# Patient Record
Sex: FEMALE | Race: WHITE | NOT HISPANIC OR LATINO | Employment: FULL TIME | URBAN - NONMETROPOLITAN AREA
[De-identification: names, ages, dates, MRNs, and addresses within clinical notes are randomized per-mention and may not be internally consistent; named-entity substitution may affect disease eponyms.]

---

## 2017-05-05 ENCOUNTER — HISTORY (OUTPATIENT)
Dept: FAMILY MEDICINE | Facility: OTHER | Age: 37
End: 2017-05-05

## 2017-05-05 ENCOUNTER — OFFICE VISIT - GICH (OUTPATIENT)
Dept: FAMILY MEDICINE | Facility: OTHER | Age: 37
End: 2017-05-05

## 2017-05-05 DIAGNOSIS — Z00.00 ENCOUNTER FOR GENERAL ADULT MEDICAL EXAMINATION WITHOUT ABNORMAL FINDINGS: ICD-10-CM

## 2017-05-05 DIAGNOSIS — H60.542 ACUTE ECZEMATOID OTITIS EXTERNA OF LEFT EAR: ICD-10-CM

## 2017-05-05 DIAGNOSIS — Z12.4 ENCOUNTER FOR SCREENING FOR MALIGNANT NEOPLASM OF CERVIX: ICD-10-CM

## 2017-05-05 DIAGNOSIS — L65.9 NONSCARRING HAIR LOSS: ICD-10-CM

## 2017-05-05 DIAGNOSIS — M25.50 PAIN IN JOINT: ICD-10-CM

## 2017-05-05 DIAGNOSIS — R53.83 OTHER FATIGUE: ICD-10-CM

## 2017-05-05 LAB
ABSOLUTE BASOPHILS - HISTORICAL: 0.1 THOU/CU MM
ABSOLUTE EOSINOPHILS - HISTORICAL: 0.1 THOU/CU MM
ABSOLUTE LYMPHOCYTES - HISTORICAL: 1.2 THOU/CU MM (ref 0.9–2.9)
ABSOLUTE MONOCYTES - HISTORICAL: 0.4 THOU/CU MM
ABSOLUTE NEUTROPHILS - HISTORICAL: 3.4 THOU/CU MM (ref 1.7–7)
BASOPHILS # BLD AUTO: 1.4 %
EOSINOPHIL NFR BLD AUTO: 1 %
ERYTHROCYTE [DISTWIDTH] IN BLOOD BY AUTOMATED COUNT: 11.6 % (ref 11.5–15.5)
ERYTHROCYTE [SEDIMENTATION RATE] IN BLOOD: 7 MM/HR
FERRITIN SERPL-MCNC: 6.9 NG/ML (ref 23.9–336.2)
HCT VFR BLD AUTO: 40.6 % (ref 33–51)
HEMOGLOBIN: 13.6 G/DL (ref 12–16)
LYMPHOCYTES NFR BLD AUTO: 23 % (ref 20–44)
MCH RBC QN AUTO: 32.8 PG (ref 26–34)
MCHC RBC AUTO-ENTMCNC: 33.5 G/DL (ref 32–36)
MCV RBC AUTO: 98 FL (ref 80–100)
MONOCYTES NFR BLD AUTO: 7.7 %
NEUTROPHILS NFR BLD AUTO: 66.8 % (ref 42–72)
PLATELET # BLD AUTO: 251 THOU/CU MM (ref 140–440)
PMV BLD: 8.1 FL (ref 6.5–11)
RED BLOOD COUNT - HISTORICAL: 4.15 MIL/CU MM (ref 4–5.2)
T4 FREE SERPL-MCNC: 0.79 NG/DL (ref 0.58–1.64)
TSH - HISTORICAL: 2.22 UIU/ML (ref 0.34–5.6)
VITAMIN D TOTAL - HISTORICAL: 25.6 NG/ML
WHITE BLOOD COUNT - HISTORICAL: 5 THOU/CU MM (ref 4.5–11)

## 2017-05-06 LAB — LYME SCREEN W/REFLEX WEST BLOT - HISTORICAL: NEGATIVE

## 2017-05-24 ENCOUNTER — COMMUNICATION - GICH (OUTPATIENT)
Dept: FAMILY MEDICINE | Facility: OTHER | Age: 37
End: 2017-05-24

## 2017-05-24 DIAGNOSIS — R79.0 ABNORMAL LEVEL OF BLOOD MINERAL: ICD-10-CM

## 2017-05-24 DIAGNOSIS — E55.9 VITAMIN D DEFICIENCY: ICD-10-CM

## 2017-06-29 ENCOUNTER — COMMUNICATION - GICH (OUTPATIENT)
Dept: FAMILY MEDICINE | Facility: OTHER | Age: 37
End: 2017-06-29

## 2017-06-29 DIAGNOSIS — E55.9 VITAMIN D DEFICIENCY: ICD-10-CM

## 2017-08-12 ENCOUNTER — COMMUNICATION - GICH (OUTPATIENT)
Dept: FAMILY MEDICINE | Facility: OTHER | Age: 37
End: 2017-08-12

## 2017-08-12 DIAGNOSIS — E55.9 VITAMIN D DEFICIENCY: ICD-10-CM

## 2017-08-27 ENCOUNTER — COMMUNICATION - GICH (OUTPATIENT)
Dept: FAMILY MEDICINE | Facility: OTHER | Age: 37
End: 2017-08-27

## 2017-08-27 DIAGNOSIS — E55.9 VITAMIN D DEFICIENCY: ICD-10-CM

## 2017-10-01 ENCOUNTER — COMMUNICATION - GICH (OUTPATIENT)
Dept: FAMILY MEDICINE | Facility: OTHER | Age: 37
End: 2017-10-01

## 2017-10-01 DIAGNOSIS — R79.0 ABNORMAL LEVEL OF BLOOD MINERAL: ICD-10-CM

## 2017-11-04 ENCOUNTER — HISTORY (OUTPATIENT)
Dept: FAMILY MEDICINE | Facility: OTHER | Age: 37
End: 2017-11-04

## 2017-11-04 ENCOUNTER — OFFICE VISIT - GICH (OUTPATIENT)
Dept: FAMILY MEDICINE | Facility: OTHER | Age: 37
End: 2017-11-04

## 2017-11-04 DIAGNOSIS — J01.00 ACUTE MAXILLARY SINUSITIS: ICD-10-CM

## 2017-11-06 ENCOUNTER — COMMUNICATION - GICH (OUTPATIENT)
Dept: FAMILY MEDICINE | Facility: OTHER | Age: 37
End: 2017-11-06

## 2017-12-27 NOTE — PROGRESS NOTES
Patient Information     Patient Name Coni Nye [id number] Female 1980      Progress Notes by Grace Rojas NP at 2017 11:15 AM     Author:  Grace Rojas NP Service:  (none) Author Type:  PHYS- Nurse Practitioner     Filed:  2017 12:54 PM Encounter Date:  2017 Status:  Signed     :  Grace Rojas NP (PHYS- Nurse Practitioner)            HPI:    Coni Root is a 37 y.o. female who presents to clinic today for UR sx. She has had sore throat earlier in the week. Mild sneezing, sinus congestion. Having LGT. Having facial pain. Having a large amount of sinus drainage. Taking Advil cold and sinus and nose sprays for sx, helped initially but not fully resolving sx. She does have sx of sinusitis, not recurrent.      No past medical history on file.  No past surgical history on file.  Social History       Substance Use Topics         Smoking status:   Never Smoker     Smokeless tobacco:   Never Used     Alcohol use   Yes      Comment: wine few a week      Current Outpatient Prescriptions       Medication  Sig Dispense Refill     ergocalciferol (VITAMIN D2; DRISDOL) 50,000 unit capsule Take 1 capsule by mouth once weekly. 6 capsule 0     ferrous sulfate, 65 mg elemental, tablet Take 1 tablet by mouth 2 times daily with meals. 180 tablet 0     multivitamins-calcium-iron-minerals 18-0.4 mg tab tablet Take 1 tablet by mouth once daily.  0     triamcinolone (ARISTOCORT; KENALOG) 0.1 % cream Apply  topically to affected area(s) 3 times daily. 30 g 0     No current facility-administered medications for this visit.      Medications have been reviewed by me and are current to the best of my knowledge and ability.    No Known Allergies    ROS:  Pertinent positives and negatives are noted in HPI.    EXAM:  General appearance: ill but nontoxic appearing female, in no acute distress  Head: normocephalic, atraumatic, maxillary sinus tenderness  Ears: TM's with cone of light, no  erythema, canals clear bilaterally  Eyes: conjunctivae normal  Orophayrnx: moist mucous membranes, tonsils without erythema, exudates or petechiae, post nasal drip seen  Nose: bilateral turbinates swollen and erythematous  Neck: supple without adenopathy  Respiratory: clear to auscultation bilaterally  Cardiac: RRR with no murmurs  Psychological: normal affect, alert and pleasant    ASSESSMENT/PLAN:    ICD-10-CM    1. Acute maxillary sinusitis, recurrence not specified J01.00 amoxicillin-clavulanate 875-125 mg tablet (AUGMENTIN)   Tx with Augmentin for acute sinusitis. Reviewed need to complete all antibiotics. Discussed typical course of illness, symptomatic treatment and when to return to clinic. Patient in agreement with plan and all questions were answered.     Patient Instructions   Augmentin twice daily for 10 days  Use Flonase nasal spray  Take a probiotic or eat yogurt daily  Follow up as needed

## 2017-12-28 NOTE — TELEPHONE ENCOUNTER
Patient Information     Patient Name MRConi Maloney [id number] Female 1980      Telephone Encounter by Maribell Greenwood at 2017 10:42 AM     Author:  Maribell Greenwood Service:  (none) Author Type:  (none)     Filed:  2017 10:44 AM Encounter Date:  2017 Status:  Signed     :  Maribell Greenwood            Patient called stating she has a question regarding amoxicillin rx given . Please advise.    Maribell Greenwood ....................  2017   10:43 AM

## 2017-12-28 NOTE — TELEPHONE ENCOUNTER
Patient Information     Patient Name MRConi Maloney [id number] Female 1980      Telephone Encounter by Mj Escobar RN at 2017  4:38 PM     Author:  Mj Escobar RN Service:  (none) Author Type:  NURS- Registered Nurse     Filed:  2017  4:45 PM Encounter Date:  2017 Status:  Signed     :  Mj Escobar RN (NURS- Registered Nurse)            Writer received rx request for patient's ergocalciferol from [facility] pharmacy. Writer notes that this rx request had been responded to previously by this writer on 8/15/17, see 17 refill encounter. Rx refill is not appropriate. Patient needs to be seen by PCP, as well as needs recheck labs. Writer had previously notified patient of this via voicemail. Call placed to patient to discuss today. Was unable to reach patient at this time. Writer did leave additional detailed message on patient's voicemail at this time of above information and Dr. Herrera's response on 8/15/17. Writer will again refuse this rx request as per Dr. Herrera's response.    Unable to complete prescription refill per RN Medication Refill Policy.................... Mj Escobar RN ....................  2017   4:43 PM

## 2017-12-28 NOTE — TELEPHONE ENCOUNTER
Patient Information     Patient Name Coni Nye [id number] Female 1980      Telephone Encounter by Mj Escobar RN at 8/15/2017  4:35 PM     Author:  jM Escobar RN Service:  (none) Author Type:  NURS- Registered Nurse     Filed:  8/15/2017  4:37 PM Encounter Date:  2017 Status:  Signed     :  Mj Escobar RN (NURS- Registered Nurse)            Perfect. I left a message on her voicemail that she needed to be seen/have a lab appointment completed for a recheck vitamin D level previously today. I will refuse rx request at this time.    Unable to complete prescription refill per RN Medication Refill Policy.................... Mj Escobar RN ....................  8/15/2017   4:36 PM

## 2017-12-28 NOTE — TELEPHONE ENCOUNTER
Patient Information     Patient Name MRConi Maloney [id number] Female 1980      Telephone Encounter by Mj Escobar RN at 2017  9:28 AM     Author:  Mj Escobar RN Service:  (none) Author Type:  NURS- Registered Nurse     Filed:  2017  9:29 AM Encounter Date:  2017 Status:  Signed     :  Mj Escobar RN (NURS- Registered Nurse)            See refill request dated 17. Writer will close this encounter.    Mj Escobar RN ....................  2017   9:28 AM

## 2017-12-28 NOTE — TELEPHONE ENCOUNTER
Patient Information     Patient Name MRConi Maloney [id number] Female 1980      Telephone Encounter by Wendy Stacy at 2017 11:00 AM     Author:  Wendy Stacy Service:  (none) Author Type:  NURS- Student Practical Nurse     Filed:  2017 11:03 AM Encounter Date:  2017 Status:  Signed     :  Wendy Satcy (NURS- Student Practical Nurse)            Patient states that she is now having diarrhea, watery stools, about 3 times in 2 hours this morning starting a little yesterday. Patient is not currently having fevers but had one on Saturday and . Patient is also feeling weak. Patient states sinus congestion is getting better. Patient uses [facility]. Patient would like to know if this is normal.  Wendy Stacy LPN .............2017  11:02 AM

## 2017-12-28 NOTE — TELEPHONE ENCOUNTER
Patient Information     Patient Name MRN Coni Pineda [id number] Female 1980      Telephone Encounter by Marylou Yap at 2017  9:26 AM     Author:  Marylou Yap Service:  (none) Author Type:  (none)     Filed:  2017  9:27 AM Encounter Date:  2017 Status:  Signed     :  Marylou Yap RN refill Medication.  Marylou Yap LPN .............2017  9:26 AM

## 2017-12-28 NOTE — TELEPHONE ENCOUNTER
Patient Information     Patient Name MRN Coni Pineda [id number] Female 1980      Telephone Encounter by Kaushik Herrera MD at 8/15/2017  4:29 PM     Author:  Kaushik Herrera MD Service:  (none) Author Type:  Physician     Filed:  8/15/2017  4:29 PM Encounter Date:  2017 Status:  Signed     :  Kaushik Herrera MD (Physician)            Filling is not indicated. She needs to return for repeat vitamin D level.

## 2017-12-28 NOTE — TELEPHONE ENCOUNTER
Patient Information     Patient Name Coni Nye [id number] Female 1980      Telephone Encounter by Devon Rojas at 2017 11:50 AM     Author:  Devon Rojas Service:  (none) Author Type:  (none)     Filed:  2017 11:52 AM Encounter Date:  2017 Status:  Signed     :  Devon Rojas            After birth date was verified, spoke with patient and let her know the below information from Grace Rojas NP. Patient states that she will stay on her current antibiotic, as she is happy it is a normal side effect. No further questions or concerns.    Devon Rojas ....................  2017   11:51 AM

## 2017-12-28 NOTE — TELEPHONE ENCOUNTER
Patient Information     Patient Name MRN Coni Pindea [id number] Female 1980      Telephone Encounter by Mj Escobar RN at 8/15/2017  4:14 PM     Author:  Mj Escobar RN Service:  (none) Author Type:  NURS- Registered Nurse     Filed:  8/15/2017  4:22 PM Encounter Date:  2017 Status:  Signed     :  Mj Escobar RN (NURS- Registered Nurse)            This is a Refill request from: [facility]  Name of Medication: Vitamin D 50,000 units  Quantity requested: 6 capsules  Last fill date: 17 as per rx request and chart review  Due for refill: unknown, see below  Last visit with HUYEN TOM was on: 2017 in [facility]  PCP:  Huyen Tom MD  Controlled Substance Agreement: N/A   Diagnosis r/t this medication request: Vitamin D deficiency    Chart review shows that patient was to have her vitamin D level rechecked in 6 weeks after starting Vitamin D on 17. Level hasn't been rechecked as per chart review, and when rx was renewed for another 6 weeks on 17 refill encounter, patient was given a verbal reminder. No appointment noted... Call placed to patient today to discuss. Was unable to reach patient at this time. Detailed message left on patient's voicemail about need for a vitamin D level and lab only appointment. Writer is unable to fill rx as requested. Will route to PCP's teamlet in the absence of PCP. PCP returns on 17.     Unable to complete prescription refill per RN Medication Refill Policy.................... Mj Escobar RN ....................  8/15/2017   4:14 PM

## 2017-12-28 NOTE — TELEPHONE ENCOUNTER
Patient Information     Patient Name MRConi Maloney [id number] Female 1980      Telephone Encounter by Mj Escobar RN at 10/3/2017  1:26 PM     Author:  Mj Escobar RN Service:  (none) Author Type:  NURS- Registered Nurse     Filed:  10/3/2017  1:32 PM Encounter Date:  10/1/2017 Status:  Signed     :  Mj Escobar RN (NURS- Registered Nurse)            Iron    Office visit in the past 12 months or per provider note.    Last visit with FARIBA CARVER was on: 2017 in [facility]  Next visit with FARIBA CARVER is on: No future appointment listed with this provider  Next visit with Family Practice is on: No future appointment listed in this department    Lab test requirements:  Annual hemoglobin.  HEMOGLOBIN                (g/dL)    Date Value   2017 13.6     Max refill for 12 months from last office visit or per provider note.    Chart review shows that patient was last seen by PCP on 17. Telephone encounter dated 17 with plan in relation to rx as requested as follows:    Please apologize for the delay, it appears one of the nurses reviewed the labs so they dropped off my desktop  Iron stores are very low, could be causing some of her symptoms  Vit d is also on low end of normal  Other labs (thyroid) are normal  i have sent prescription for both iron and vit d, plan to recheck labs in 6 weeks    No follow up labs have been completed at this time. Writer will refill rx as requested for a limited supply, and will send patient a reminder letter.    Prescription refilled per RN Medication Refill Policy.................... Mj Escobar RN ....................  10/3/2017   1:29 PM

## 2017-12-28 NOTE — TELEPHONE ENCOUNTER
Patient Information     Patient Name Coni Nye [id number] Female 1980      Telephone Encounter by Grace Mcgee NP at 2017 11:14 AM     Author:  Grace Mcgee NP Service:  (none) Author Type:  PHYS- Nurse Practitioner     Filed:  2017 11:16 AM Encounter Date:  2017 Status:  Signed     :  Grace Mcgee NP (PHYS- Nurse Practitioner)            A side effect of Augmentin can be diarrhea. If she is having a lot of trouble with this we can change the abx. GRACE MCGEE NP ....................  2017   11:16 AM

## 2017-12-29 NOTE — PATIENT INSTRUCTIONS
Patient Information     Patient Name Coni Nye [id number] Female 1980      Patient Instructions by Grace Rojas NP at 2017 11:15 AM     Author:  Grace Rojas NP Service:  (none) Author Type:  PHYS- Nurse Practitioner     Filed:  2017 11:39 AM Encounter Date:  2017 Status:  Signed     :  Grace Rojas NP (PHYS- Nurse Practitioner)            Augmentin twice daily for 10 days  Use Flonase nasal spray  Take a probiotic or eat yogurt daily  Follow up as needed

## 2017-12-30 NOTE — NURSING NOTE
"Patient Information     Patient Name MRConi Maloney [id number] Female 1980      Nursing Note by Laurie Edmonds at 2017 11:15 AM     Author:  Laurie Edmonds Service:  (none) Author Type:  (none)     Filed:  2017 11:20 AM Encounter Date:  2017 Status:  Signed     :  Laurie Edmonds            Patient presents to the clinic for sinus drainage, sore throat, sinus pressure and ear pressure x1 week. Patient reports feeling her ears \"popping\" and having green colored sinus drainage.  Laurie CHA, DEVANTE.......2017..11:16 AM           "

## 2018-01-04 NOTE — NURSING NOTE
Patient Information     Patient Name MRN Coni Pineda [id number] Female 1980      Nursing Note by Marylou Yap at 2017 10:00 AM     Author:  Marylou Yap Service:  (none) Author Type:  (none)     Filed:  2017 10:15 AM Encounter Date:  2017 Status:  Signed     :  Marylou Yap            Patient is here for physical, complaining of being tired for past year. Complaining of neck, feet, and hand pain. Moved here last year from [place].  Marylou Yap LPN .............2017  9:59 AM

## 2018-01-05 NOTE — TELEPHONE ENCOUNTER
Patient Information     Patient Name MRConi Maloney [id number] Female 1980      Telephone Encounter by Huyen Tom MD at 2017 10:05 AM     Author:  Huyen Tom MD Service:  (none) Author Type:  Physician     Filed:  2017 10:08 AM Encounter Date:  2017 Status:  Signed     :  Huyen Tom MD (Physician)            Please apologize for the delay, it appears one of the nurses reviewed the labs so they dropped off my desktop  Iron stores are very low, could be causing some of her symptoms  Vit d is also on low end of normal  Other labs (thyroid) are normal  i have sent prescription for both iron and vit d, plan to recheck labs in 6 weeks  Huyen Astudillo MD  10:07 AM 2017

## 2018-01-05 NOTE — TELEPHONE ENCOUNTER
Patient Information     Patient Name MRConi Maloney [id number] Female 1980      Telephone Encounter by Ivelisse Packer at 2017  8:59 AM     Author:  Ivelisse Packer Service:  (none) Author Type:  (none)     Filed:  2017  9:01 AM Encounter Date:  2017 Status:  Signed     :  Ivelisse Packer            Patient is waiting for her lab results from her last visit. Please call her with results.

## 2018-01-05 NOTE — PROGRESS NOTES
Patient Information     Patient Name MRN Sex Coni Dominique [id number] Female 1980      Progress Notes by Huyen Tom MD at 2017 10:00 AM     Author:  Huyen Tom MD Service:  (none) Author Type:  Physician     Filed:  5/10/2017  9:51 AM Encounter Date:  2017 Status:  Signed     :  uHyen Tom MD (Physician)            ANNUAL EXAM ADULT FEMALE - GYN    Coni Root is a 36 y.o. female, G 2, P 2, here today for her annual gynecologic exam.  HPI:  Presents for annual exam. She is concerned about thinning hair, fatigue, inability to lose weight despite exercise and diet changes.  She had some depression when they first moved here, but now feels her mood is good. She sleeps well at night  Menses can be very heavy, flow 7 days then the next month lighter. NO abnormal pap smears  She has patch of dry skin by her ear.    CURRENT MEDICATIONS:  Current Outpatient Prescriptions       Medication  Sig Dispense Refill     multivitamins-calcium-iron-minerals 18-0.4 mg tab tablet Take 1 tablet by mouth once daily.  0     No current facility-administered medications for this visit.      Medications have been reviewed by me and are current to the best of my knowledge and ability.      ALLERGIES:  Review of patient's allergies indicates no known allergies.     No past medical history on file.    No past surgical history on file.    SOCIAL HISTORY:  Social History     Social History        Marital status:       Spouse name: N/A     Number of children:  N/A     Years of education:  N/A     Occupational History      Not on file.     Social History Main Topics          Smoking status:   Never Smoker      Smokeless tobacco:   Never Used      Alcohol use   Yes      Comment: wine few a week       Drug use:   No      Sexual activity:   Yes      Partners:  Male      Birth control/ protection:  Surgical      Other Topics  Concern     Not on file      Social History  "Narrative      No narrative on file       No family history on file.    RISK FACTORS  Social History       Substance Use Topics         Smoking status:   Never Smoker     Smokeless tobacco:   Never Used     Alcohol use   Yes      Comment: wine few a week       Exercise Times/wk: 4  Seat Belt Use: 100%  Birth Control Method: none  High Risk/Behavior: none    PREVENTIVE SERVICES  Preventive services were reviewed today, May 5, 2017.    LMP: Patient's last menstrual period was 04/28/2017.  Menstrual Regularity: regular, every 25 days  Flow: heavy    ROS  Negative for Review of Systems not covered in the HPI.    PHYSICAL EXAM  /66  Pulse 66  Ht 1.791 m (5' 10.5\")  Wt 77.2 kg (170 lb 3.2 oz)  LMP 04/28/2017  BMI 24.08 kg/m2  General Appearance:  Alert, appropriate appearance for age. No acute distress  HEENT Exam:  Grossly normal.  Neck / Thyroid Exam:  Supple, no masses, nodes or enlargement.  Chest/Respiratory Exam: Normal chest wall and respirations. Clear to auscultation.  Breast Exam: No dimpling, nipple retraction or discharge. No masses or nodes.  Cardiovascular Exam: Regular rate and rhythm. S1, S2, no murmur, click, gallop, or rubs.  Gastrointestinal Exam: Soft, non-tender, no masses or organomegaly.  Pelvic Exam Female: Vulva and vagina appear normal. Bimanual exam reveals normal uterus and adnexa. Clinical staff offered to be present for exam: .   Lymphatic Exam: Non-palpable nodes in neck, clavicular, axillary, or inguinal regions.  Musculoskeletal Exam: Back is straight and non-tender, full ROM of upper and lower extremities.  Skin: no rash or abnormalities  Neurologic Exam: Normal gait and speech, no tremor.  Psychiatric Exam: Alert and oriented, appropriate affect.  Clinical staff offered to be present for exam: yes, pt declined.  PHQ Depression Screening 5/5/2017   Date of PHQ exam (doc flow) 5/5/2017   1. Lack of interest/pleasure 0 - Not at all   2. Feeling down/depressed 0 - Not at all "   PHQ-2 TOTAL SCORE 0       ASSESSMENT/PLAN    ICD-10-CM    1. Annual physical exam Z00.00    2. Thinning hair L65.9 VITAMIN D 25 (DEFICIENCY)      TSH      T4,FREE      FERRITIN      VITAMIN D 25 (DEFICIENCY)      TSH      T4,FREE      FERRITIN   3. Fatigue, unspecified type R53.83 TSH      T4,FREE      LYME SCREEN W/REFLEX      CBC WITH DIFFERENTIAL      FERRITIN      TSH      T4,FREE      LYME SCREEN W/REFLEX      CBC WITH DIFFERENTIAL      FERRITIN      CBC WITH AUTO DIFFERENTIAL   4. Arthralgia, unspecified joint M25.50 SEDIMENTATION RATE      SEDIMENTATION RATE   5. Cervical cancer screening Z12.4 GYN THIN PREP PAP SCREEN IMAGED      GYN THIN PREP PAP SCREEN IMAGED   6. 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 Rios Body mass index is 24.08 kg/(m^2). This is within the normal range for a 36 y.o. Normal range for ages 18+ is between 18.5 and 24.9.   BP Readings from Last 1 Encounters:05/05/17 : 104/66  Ms. Rios blood pressure is within the normal range for adults. Per JNC-8 guidelines normal adult blood pressure is < 120/80, pre-hypertensive is between 120/80 and 139/89, and hypertension is 140/90 or greater.  Patient is counseled on importance of regular self breast exams, annual mammogram starting at the age of 40. Patient may go to every 3 years for screening Pap smears, should continue annual pelvic exams. Discussed the importance of calcium and vitamin D supplementation and screening for osteoporosis. Immunizations are current. Pap smear was performed today and patient will receive a letter with results. Reviewed the importance of sunscreen, seatbelt and helmet use.    Concerned about iron ef anemia, given menorrhagia. Will check above labs and contact with results.  triam cream for rash  Huyen Astudillo MD  9:51 AM 5/10/2017     There are no Patient Instructions on file for this visit.

## 2018-01-05 NOTE — TELEPHONE ENCOUNTER
Patient Information     Patient Name MRConi Maloney [id number] Female 1980      Telephone Encounter by Alton Wilkinson at 2017  5:10 PM     Author:  Alton Wilkinson Service:  (none) Author Type:  (none)     Filed:  2017  5:14 PM Encounter Date:  2017 Status:  Signed     :  Alton Wilkinson            Patient notified of Dr. Vega note.  Patient verbalizes understanding and will  meds and have labs rechecked in 6 weeks.  Alton Wilkinson LPN ....................  2017   5:11 PM

## 2018-01-26 ENCOUNTER — DOCUMENTATION ONLY (OUTPATIENT)
Dept: FAMILY MEDICINE | Facility: OTHER | Age: 38
End: 2018-01-26

## 2018-01-26 PROBLEM — H60.542 ECZEMA OF LEFT EXTERNAL EAR: Status: ACTIVE | Noted: 2017-05-05

## 2018-01-26 RX ORDER — ERGOCALCIFEROL 1.25 MG/1
50000 CAPSULE, LIQUID FILLED ORAL WEEKLY
COMMUNITY
Start: 2017-06-29 | End: 2019-04-22

## 2018-01-26 RX ORDER — FERROUS SULFATE 325(65) MG
325 TABLET ORAL 2 TIMES DAILY PRN
COMMUNITY
Start: 2017-10-03 | End: 2023-08-04

## 2018-01-26 RX ORDER — TRIAMCINOLONE ACETONIDE 1 MG/G
CREAM TOPICAL 3 TIMES DAILY
COMMUNITY
Start: 2017-05-05 | End: 2020-10-19

## 2018-01-27 VITALS
TEMPERATURE: 100.1 F | DIASTOLIC BLOOD PRESSURE: 70 MMHG | HEIGHT: 71 IN | BODY MASS INDEX: 24.36 KG/M2 | WEIGHT: 174 LBS | SYSTOLIC BLOOD PRESSURE: 118 MMHG | HEART RATE: 60 BPM

## 2018-01-27 VITALS
WEIGHT: 170.2 LBS | BODY MASS INDEX: 23.83 KG/M2 | SYSTOLIC BLOOD PRESSURE: 104 MMHG | HEIGHT: 71 IN | HEART RATE: 66 BPM | DIASTOLIC BLOOD PRESSURE: 66 MMHG

## 2018-07-23 NOTE — PROGRESS NOTES
Patient Information     Patient Name  Coni Root MRN  [id number] Sex  Female   1980      Letter by Huyen Tom MD at      Author:  Huyen Tom MD Service:  (none) Author Type:  (none)    Filed:   Encounter Date:  10/1/2017 Status:  (Other)           Coni Root  [address] [place] [address]          October 3, 2017    Dear Ms. Root:    This is to remind you that you are overdue for your 6 week follow-up labwork in relation to starting on vitamin D and iron on 17. Your last visit was on 17. Additional refills of your medication require you to complete this visit.    Please call [phone] to schedule your lab only appointment.    Thank you for choosing [facility] for your health care needs.    Sincerely,      Refill RN  [facility]

## 2018-07-23 NOTE — PROGRESS NOTES
Patient Information     Patient Name  Coni Root MRN  [id number] Sex  Female   1980      Letter by Huyen Tom MD at      Author:  Huyen Tom MD Service:  (none) Author Type:  (none)    Filed:   Encounter Date:  2017 Status:  (Other)           Coni Root  [address] [place] [address]          May 16, 2017    Dear Ms. Root:    Recent pap smear was normal    Sincerely,  Huyen Astudillo MD  12:18 PM 2017

## 2018-11-24 ENCOUNTER — OFFICE VISIT (OUTPATIENT)
Dept: FAMILY MEDICINE | Facility: OTHER | Age: 38
End: 2018-11-24
Attending: NURSE PRACTITIONER
Payer: COMMERCIAL

## 2018-11-24 VITALS
SYSTOLIC BLOOD PRESSURE: 100 MMHG | WEIGHT: 175.4 LBS | TEMPERATURE: 97.8 F | HEIGHT: 70 IN | DIASTOLIC BLOOD PRESSURE: 52 MMHG | HEART RATE: 56 BPM | BODY MASS INDEX: 25.11 KG/M2

## 2018-11-24 DIAGNOSIS — J06.9 VIRAL UPPER RESPIRATORY TRACT INFECTION: ICD-10-CM

## 2018-11-24 DIAGNOSIS — R09.81 SINUS CONGESTION: Primary | ICD-10-CM

## 2018-11-24 PROCEDURE — 99213 OFFICE O/P EST LOW 20 MIN: CPT | Performed by: NURSE PRACTITIONER

## 2018-11-24 ASSESSMENT — PAIN SCALES - GENERAL: PAINLEVEL: SEVERE PAIN (6)

## 2018-11-24 NOTE — PROGRESS NOTES
"Nursing Notes:   Mary Jo Bowling LPN  11/24/2018 10:21 AM  Unsigned  Patient in clinic for L ear and sinus problem 11/21. Has been treating with advil cold and sinus/and netti pot. Not effective last 2 days. Tubes in left ear as an adult.  Mary Jo Bowling LPN....................  11/24/2018   10:19 AM    Chief Complaint   Patient presents with     Sinus Problem     Ear Problem       Initial There were no vitals taken for this visit. Estimated body mass index is 24.44 kg/(m^2) as calculated from the following:    Height as of 11/4/17: 5' 10.75\" (1.797 m).    Weight as of 11/4/17: 174 lb (78.9 kg).  Medication Reconciliation: complete    Mary Jo Bowling LPN      SUBJECTIVE:   Coni Root is a 38 year old female who presents to clinic today for the following health issues:    RESPIRATORY SYMPTOMS      Duration: 4-5 days    Description  nasal congestion, rhinorrhea, facial pain/pressure, cough and ear pain left    Severity: moderate    Accompanying signs and symptoms: No fevers, chills, no sob, wheezing.     History (predisposing factors):  history of recurrent otitis media and had tubes placed 2 years ago. Is not present now.     Precipitating or alleviating factors: None    Therapies tried and outcome:  rest and fluids oral decongestant        Problem list and histories reviewed & adjusted, as indicated.  Additional history: as documented    Current Outpatient Prescriptions   Medication Sig Dispense Refill     ferrous sulfate (IRON) 325 (65 FE) MG tablet Take 325 mg by mouth 2 times daily (with meals)       triamcinolone (KENALOG) 0.1 % cream Apply topically 3 times daily       vitamin D (ERGOCALCIFEROL) 43145 UNIT capsule Take 50,000 Units by mouth once a week       No Known Allergies      ROS:  Notable findings in the HPI.       OBJECTIVE:     /52 (BP Location: Right arm, Patient Position: Sitting, Cuff Size: Adult Regular)  Pulse 56  Temp 97.8  F (36.6  C) (Tympanic)  Ht 5' 10\" (1.778 m)  Wt 175 lb " 6.4 oz (79.6 kg)  LMP 11/03/2018 (Approximate)  Breastfeeding? No  BMI 25.17 kg/m2  Body mass index is 25.17 kg/(m^2).  GENERAL: healthy, alert and no distress  EYES: Eyes grossly normal to inspection  HENT: normal cephalic/atraumatic, right ear: normal: no effusions, no erythema, normal landmarks, left ear: normal: no effusions, no erythema, normal landmarks, nose and mouth without ulcers or lesions, nasal mucosa edematous , rhinorrhea clear, oropharynx clear, oral mucous membranes moist and sinuses: maxillary, frontal tenderness on bilateral  NECK: no adenopathy  RESP: lungs clear to auscultation - no rales, rhonchi or wheezes  CV: regular rates and rhythm, normal S1 S2, no S3 or S4 and no murmur, click or rub  SKIN: no suspicious lesions or rashes    Diagnostic Test Results:  none     ASSESSMENT/PLAN:     1. Sinus congestion    2. Viral upper respiratory tract infection        PLAN:    URI Adult:  Tylenol, Ibuprofen, Fluids, Rest, OTC cough suppressant/expectorant, OTC decongestant/antihistamine, Saline gargles, Saline nasal spray and Vaporizer  Discussed viral illnesses and JESUS, Symptoms likely due to virus. No antibiotic is needed at this time. Symptoms typically worse on days 2-5 and then stabilize and you are sick for days 5-12. Days 12-14 there is slow resolution and if there is a cough, studies show it can linger longer, however one is not as ill as in the beginning. If symptoms begin worsening or fail to improve after 14 days, return to clinic for reevaluation. All questions were answered and she is in agreement with plan.       Followup:    If not improving or if condition worsens, follow up with your Primary Care Provider    I explained my diagnostic considerations and recommendations to the patient, who voiced understanding and agreement with the treatment plan. All questions were answered. We discussed potential side effects of any prescribed or recommended therapies, as well as expectations for  response to treatments. She was advised to contact our office if there is no improvement or worsening of conditions or symptoms.  If s/s worsen or persist, patient will either come back or follow up with PCP.    Disclaimer:  This note consists of words and symbols derived from keyboarding, dictation, or using voice recognition software. As a result, there may be errors in the script that have gone undetected. Please consider this when interpreting information found in this note.      Nicky Pierre NP, 11/24/2018 10:27 AM

## 2018-11-24 NOTE — NURSING NOTE
"Patient in clinic for L ear and sinus problem 11/21. Has been treating with advil cold and sinus/and netti pot. Not effective last 2 days. Tubes in left ear as an adult.  Mary Jo Bowling LPN....................  11/24/2018   10:19 AM    Chief Complaint   Patient presents with     Sinus Problem     Ear Problem       Initial There were no vitals taken for this visit. Estimated body mass index is 24.44 kg/(m^2) as calculated from the following:    Height as of 11/4/17: 5' 10.75\" (1.797 m).    Weight as of 11/4/17: 174 lb (78.9 kg).  Medication Reconciliation: complete    Mary Jo Bowling LPN    "

## 2018-11-24 NOTE — PATIENT INSTRUCTIONS
Cold Medicines   What are cold medicines?  Symptoms of the common cold start gradually over several days and usually last about two weeks. Symptoms may include sneezing, a stuffy or runny nose, sore throat, cough, watery eyes, mild headache, or body aches. A cold will go away on its own without treatment. However, there are many nonprescription products that may help relieve some of the symptoms of a cold. Cold medicines often contain more than one ingredient and are used to treat more than one symptom. Read the labels and buy products that have only the ingredients that you need. If you are not sure which medicine is best, ask your pharmacist.  How do they work?  Decongestants reduce swelling in your nose and sinuses. They may also lessen the amount of mucus made by your nose. If you use decongestants more often than directed, your stuffy nose may get worse.   Antihistamines block the effect of histamine. Histamine is a chemical your body makes when you have an allergic reaction. Antihistamines are most often used to treat itchy or watery eyes or a stuffy or runny nose caused by an allergy. Antihistamines may not help a stuffy or runny nose caused by a cold because they can make mucus thick and dry.  Mucolytics are medicines that make mucus thinner so that it is easier to cough up out of your throat and lungs.  Expectorants are cough medicines that may help to keep the mucus thin and bring up mucus from the lungs when you cough. This may relieve chest congestion and make it easier to breathe.   Cough suppressants (antitussives) are medicines that lessen the urge to cough. They may give relief from a dry, hacking cough. If you have a cough that is wet sounding and produces mucus, it is important for you to cough the mucus up out of your lungs. For this reason, cough suppressants are not recommended for a wet sounding cough.  Fever and pain relievers, such as acetaminophen, aspirin, or other nonsteroidal  anti-inflammatory drugs (NSAIDs), are often included in cold medicine. Read labels carefully to avoid taking more medicine than you need.  What else do I need to know about this medicine?    Talk to your healthcare provider if your symptoms start suddenly or you have severe symptoms. This may mean you have something more serious than a cold.    Follow the directions that come with your medicine, including information about food or alcohol. Make sure you know how and when to take your medicine. Do not take more or less than you are supposed to take.    Try to get all of your medicine at the same place. Your pharmacist can help make sure that all of your medicines are safe to take together.    Keep a list of your medicines with you. List all of the prescription medicines, nonprescription medicines, supplements, natural remedies, and vitamins that you take. Tell all healthcare providers who treat you about all of the products you are taking.    Many medicines have side effects. A side effect is a symptom or problem that is caused by the medicine. Ask your healthcare provider or pharmacist what side effects the medicine may cause and what you should do if you have side effects.    Nonsteroidal anti-inflammatory medicines (NSAIDs), such as ibuprofen, naproxen, and aspirin, may cause stomach bleeding and other problems. These risks increase with age. Read the label and take as directed. Unless recommended by your healthcare provider, do not take for more than 10 days for any reason.    Acetaminophen may cause liver damage or other problems. Unless recommended by your provider, don't take more than 3000 milligrams (mg) in 24 hours. To make sure you don t take too much, check other medicines you take to see if they also contain acetaminophen. Ask your provider if you need to avoid drinking alcohol while taking this medicine.  If you have any questions, ask your healthcare provider or pharmacist for more information. Be sure  to keep all appointments for provider visits or tests.      Symptoms likely due to virus. No antibiotic is needed at this time. Symptoms typically worse on days 2-5 and then stabilize and you are sick for days 5-12. Days 12-14 there is slow resolution and if there is a cough, studies show it can linger longer, however one is not as ill as in the beginning. If symptoms begin worsening or fail to improve after 14 days, return to clinic for reevaluation.

## 2018-11-24 NOTE — MR AVS SNAPSHOT
After Visit Summary   11/24/2018    Coni Root    MRN: [id number]           Patient Information     Date Of Birth          1980        Visit Information        Provider Department      11/24/2018 10:15 AM Nicky Pierre NP [facility]        Care Instructions    Cold Medicines   What are cold medicines?  Symptoms of the common cold start gradually over several days and usually last about two weeks. Symptoms may include sneezing, a stuffy or runny nose, sore throat, cough, watery eyes, mild headache, or body aches. A cold will go away on its own without treatment. However, there are many nonprescription products that may help relieve some of the symptoms of a cold. Cold medicines often contain more than one ingredient and are used to treat more than one symptom. Read the labels and buy products that have only the ingredients that you need. If you are not sure which medicine is best, ask your pharmacist.  How do they work?  Decongestants reduce swelling in your nose and sinuses. They may also lessen the amount of mucus made by your nose. If you use decongestants more often than directed, your stuffy nose may get worse.   Antihistamines block the effect of histamine. Histamine is a chemical your body makes when you have an allergic reaction. Antihistamines are most often used to treat itchy or watery eyes or a stuffy or runny nose caused by an allergy. Antihistamines may not help a stuffy or runny nose caused by a cold because they can make mucus thick and dry.  Mucolytics are medicines that make mucus thinner so that it is easier to cough up out of your throat and lungs.  Expectorants are cough medicines that may help to keep the mucus thin and bring up mucus from the lungs when you cough. This may relieve chest congestion and make it easier to breathe.   Cough suppressants (antitussives) are medicines that lessen the urge to cough. They may give relief from a dry,  hacking cough. If you have a cough that is wet sounding and produces mucus, it is important for you to cough the mucus up out of your lungs. For this reason, cough suppressants are not recommended for a wet sounding cough.  Fever and pain relievers, such as acetaminophen, aspirin, or other nonsteroidal anti-inflammatory drugs (NSAIDs), are often included in cold medicine. Read labels carefully to avoid taking more medicine than you need.  What else do I need to know about this medicine?    Talk to your healthcare provider if your symptoms start suddenly or you have severe symptoms. This may mean you have something more serious than a cold.    Follow the directions that come with your medicine, including information about food or alcohol. Make sure you know how and when to take your medicine. Do not take more or less than you are supposed to take.    Try to get all of your medicine at the same place. Your pharmacist can help make sure that all of your medicines are safe to take together.    Keep a list of your medicines with you. List all of the prescription medicines, nonprescription medicines, supplements, natural remedies, and vitamins that you take. Tell all healthcare providers who treat you about all of the products you are taking.    Many medicines have side effects. A side effect is a symptom or problem that is caused by the medicine. Ask your healthcare provider or pharmacist what side effects the medicine may cause and what you should do if you have side effects.    Nonsteroidal anti-inflammatory medicines (NSAIDs), such as ibuprofen, naproxen, and aspirin, may cause stomach bleeding and other problems. These risks increase with age. Read the label and take as directed. Unless recommended by your healthcare provider, do not take for more than 10 days for any reason.    Acetaminophen may cause liver damage or other problems. Unless recommended by your provider, don't take more than 3000 milligrams (mg) in 24  "hours. To make sure you don t take too much, check other medicines you take to see if they also contain acetaminophen. Ask your provider if you need to avoid drinking alcohol while taking this medicine.  If you have any questions, ask your healthcare provider or pharmacist for more information. Be sure to keep all appointments for provider visits or tests.      Symptoms likely due to virus. No antibiotic is needed at this time. Symptoms typically worse on days 2-5 and then stabilize and you are sick for days 5-12. Days 12-14 there is slow resolution and if there is a cough, studies show it can linger longer, however one is not as ill as in the beginning. If symptoms begin worsening or fail to improve after 14 days, return to clinic for reevaluation.              Follow-ups after your visit        Who to contact     If you have questions or need follow up information about today's clinic visit or your schedule please contact [facility] directly at [phone].  Normal or non-critical lab and imaging results will be communicated to you by [employer], letter or phone within 4 business days after the clinic has received the results. If you do not hear from us within 7 days, please contact the clinic through [employer] or phone. If you have a critical or abnormal lab result, we will notify you by phone as soon as possible.  Submit refill requests through [employer] or call your pharmacy and they will forward the refill request to us. Please allow 3 business days for your refill to be completed.          Additional Information About Your Visit        [employer] Information     [employer] lets you send messages to your doctor, view your test results, renew your prescriptions, schedule appointments and more. To sign up, go to [email or web address] . Click on \"Log in\" on the left side of the screen, which will take you to the Welcome page. Then click on \"Sign up Now\" on the right side of the page.     You will be " "asked to enter the access code listed below, as well as some personal information. Please follow the directions to create your username and password.     Your access code is: [email or web address]  Expires: 2019 10:33 AM     Your access code will  in 90 days. If you need help or a new code, please call your [facility] clinic or [phone].        Care EveryWhere ID     This is your Care EveryWhere ID. This could be used by other organizations to access your [facility] medical records  [id number]        Your Vitals Were     Pulse Temperature Height Last Period Breastfeeding? BMI (Body Mass Index)    56 97.8  F (36.6  C) (Tympanic) 5' 10\" (1.778 m) 2018 (Approximate) No 25.17 kg/m2       Blood Pressure from Last 3 Encounters:   18 100/52   17 118/70   17 104/66    Weight from Last 3 Encounters:   18 175 lb 6.4 oz (79.6 kg)   17 174 lb (78.9 kg)   17 170 lb 3.2 oz (77.2 kg)              Today, you had the following     No orders found for display       Primary Care Provider Office Phone # Fax #    Huyen Astudillo [phone]       [address] [facility] [address]        Equal Access to Services     [facility]: Hadii aad salinas hadasho Sodoreen, waaxda luqadaha, qaybta kaalmada elfego, yenny allen . So [facility] [phone].    ATENCIÓN: Si habla español, tiene a kumar disposición servicios gratuitos de asistencia lingüística. Llame al [phone].    We comply with applicable federal civil rights laws and [place] laws. We do not discriminate on the basis of race, color, national origin, age, disability, sex, sexual orientation, or gender identity.            Thank you!     Thank you for choosing [facility] [place]  for your care. Our goal is always to provide you with excellent care. Hearing back from our patients is one way we can continue to improve our services. Please take a few minutes to " complete the written survey that you may receive in the mail after your visit with us. Thank you!             Your Updated Medication List - Protect others around you: Learn how to safely use, store and throw away your medicines at www.disposemymeds.org.          This list is accurate as of 11/24/18 10:33 AM.  Always use your most recent med list.                   Brand Name Dispense Instructions for use Diagnosis    ferrous sulfate 325 (65 Fe) MG tablet    IRON     Take 325 mg by mouth 2 times daily (with meals)        triamcinolone 0.1 % cream    KENALOG     Apply topically 3 times daily        vitamin D2 85951 UNIT capsule    ERGOCALCIFEROL     Take 50,000 Units by mouth once a week

## 2019-04-22 ENCOUNTER — OFFICE VISIT (OUTPATIENT)
Dept: FAMILY MEDICINE | Facility: OTHER | Age: 39
End: 2019-04-22
Attending: PHYSICIAN ASSISTANT
Payer: COMMERCIAL

## 2019-04-22 VITALS
BODY MASS INDEX: 24.65 KG/M2 | HEART RATE: 58 BPM | DIASTOLIC BLOOD PRESSURE: 80 MMHG | WEIGHT: 171.8 LBS | SYSTOLIC BLOOD PRESSURE: 102 MMHG | RESPIRATION RATE: 18 BRPM | TEMPERATURE: 97.3 F

## 2019-04-22 DIAGNOSIS — L98.9 SKIN LESION: Primary | ICD-10-CM

## 2019-04-22 PROCEDURE — 88305 TISSUE EXAM BY PATHOLOGIST: CPT

## 2019-04-22 PROCEDURE — 11301 SHAVE SKIN LESION 0.6-1.0 CM: CPT | Performed by: PHYSICIAN ASSISTANT

## 2019-04-22 NOTE — NURSING NOTE
Chief Complaint   Patient presents with     Mole         Medication Reconciliation: complete    Catarina Colin, LPN

## 2019-04-22 NOTE — PROGRESS NOTES
Nursing Notes:   Catarina Colin LPN  2019 12:47 PM  Signed  Chief Complaint   Patient presents with     Mole         Medication Reconciliation: complete    Catarina Colin LPN      HPI:    Coni Root is a 38 year old female who presents for mole. Changing mole on left lower breast.  Irritated.  Speckled brown.  No skin cancer concerns in the past.    History reviewed. No pertinent past medical history.    History reviewed. No pertinent surgical history.    Family History   Problem Relation Age of Onset     Colon Cancer Father 73        Cancer-colon, age 73     Family History Negative Brother         Good Health       Social History     Tobacco Use     Smoking status: Never Smoker     Smokeless tobacco: Never Used   Substance Use Topics     Alcohol use: Yes     Comment: Alcoholic Drinks/day: wine few a week       Current Outpatient Medications   Medication Sig Dispense Refill     ferrous sulfate (IRON) 325 (65 FE) MG tablet Take 325 mg by mouth 2 times daily (with meals)       triamcinolone (KENALOG) 0.1 % cream Apply topically 3 times daily         No Known Allergies    REVIEW OF SYSTEMS:  Refer to HPI.    EXAM:   Vitals:    /80 (BP Location: Right arm, Patient Position: Sitting, Cuff Size: Adult Regular)   Pulse 58   Temp 97.3  F (36.3  C)   Resp 18   Wt 77.9 kg (171 lb 12.8 oz)   BMI 24.65 kg/m      General Appearance: Pleasant, alert, appropriate appearance for age. No acute distress  Skin: Approximate 7 x 7 mm light to medium speckled brown, raised papule appreciated on left lower mid breast line.  No pus, drainage, surrounding erythema.  Psychiatric Exam: Alert and oriented - appropriate affect.    PHQ Depression Screen  No flowsheet data found.    Procedural note:   Options are discussed and she decided to proceed with skin lesion removal. Time out was called. Patient was prepped and draped in a proper sterile manner. Under sterile conditions, 1 cc 1% Lidocaine  with epi was used to numb the surrounding area. A #15 blade and forceps were used to shave the skin lesion.  Hemostasis was achieved with direct pressure and with the use of drysol. She tolerated the procedure well.  Sterile bandage was placed on the area.  No complications were appreciated.    ASSESSMENT AND PLAN:      ICD-10-CM    1. Skin lesion L98.9 Surgical pathology exam     SHAV SKIN LESION TRUNK/ARM/LEG 0.6-1.0 CM         Removed skin lesion via skin shave with no complications.  Patient tolerated the procedure well.  Sent specimen to pathology for final report.  Encouraged to keep area covered with a sterile bandage.  Return as needed for recheck.  Give infection warning signs and symptoms.    Debbie Bray PA-C..................4/22/2019 8:08 AM

## 2019-05-25 NOTE — TELEPHONE ENCOUNTER
Patient Information     Patient Name MRN Coni Pineda [id number] Female 1980      Telephone Encounter by Mj Escobar RN at 2017  9:30 AM     Author:  Mj Escobar RN Service:  (none) Author Type:  NURS- Registered Nurse     Filed:  2017  9:35 AM Encounter Date:  2017 Status:  Signed     :  Mj Escobar RN (NURS- Registered Nurse)            This is a Refill request from: [facility]  Name of Medication: Vitamin D 50,000 units  Quantity requested: 6 capsules  Last fill date: 17 as per rx request  Due for refill: Unknown, see below  Last visit with HUYEN TOM was on: 2017 in [facility]  PCP:  Huyen Tom MD  Controlled Substance Agreement:  N/A   Diagnosis r/t this medication request: Vitamin D Deficiency    Chart review shows that requested rx was prescribed by PCP on 17. Patient was to take rx for 6 weeks and then have labs rechecked as per 17 telephone encounter. However, per telephone encounter dated today, 17, patient will not have insurance until after . Patient is requesting a refill of rx as requested until her insurance starts in July. Writer is unable to refill requested rx due to rx not being available over the counter. Will route rx request to PCP for her consideration/approval.     Unable to complete prescription refill per RN Medication Refill Policy.................... Mj Escobar RN ....................  2017   9:30 AM         25-May-2019 15:18

## 2019-07-09 ENCOUNTER — HOSPITAL ENCOUNTER (OUTPATIENT)
Dept: GENERAL RADIOLOGY | Facility: OTHER | Age: 39
Discharge: HOME | End: 2019-07-09
Attending: FAMILY MEDICINE | Admitting: FAMILY MEDICINE
Payer: COMMERCIAL

## 2019-07-09 ENCOUNTER — OFFICE VISIT (OUTPATIENT)
Dept: FAMILY MEDICINE | Facility: OTHER | Age: 39
End: 2019-07-09
Attending: FAMILY MEDICINE
Payer: COMMERCIAL

## 2019-07-09 VITALS
TEMPERATURE: 99 F | HEIGHT: 70 IN | BODY MASS INDEX: 24.91 KG/M2 | RESPIRATION RATE: 18 BRPM | OXYGEN SATURATION: 99 % | SYSTOLIC BLOOD PRESSURE: 124 MMHG | HEART RATE: 54 BPM | WEIGHT: 174 LBS | DIASTOLIC BLOOD PRESSURE: 70 MMHG

## 2019-07-09 DIAGNOSIS — R79.0 LOW FERRITIN: ICD-10-CM

## 2019-07-09 DIAGNOSIS — Z83.42 FAMILY HISTORY OF HIGH CHOLESTEROL: ICD-10-CM

## 2019-07-09 DIAGNOSIS — Z00.00 ANNUAL PHYSICAL EXAM: Primary | ICD-10-CM

## 2019-07-09 DIAGNOSIS — M54.2 BILATERAL NECK PAIN: ICD-10-CM

## 2019-07-09 DIAGNOSIS — Z12.31 ENCOUNTER FOR SCREENING MAMMOGRAM FOR BREAST CANCER: ICD-10-CM

## 2019-07-09 PROCEDURE — 99396 PREV VISIT EST AGE 40-64: CPT | Performed by: FAMILY MEDICINE

## 2019-07-09 PROCEDURE — 72040 X-RAY EXAM NECK SPINE 2-3 VW: CPT

## 2019-07-09 RX ORDER — CYCLOBENZAPRINE HCL 10 MG
10 TABLET ORAL
Qty: 30 TABLET | Refills: 0 | Status: SHIPPED | OUTPATIENT
Start: 2019-07-09 | End: 2020-10-19

## 2019-07-09 RX ORDER — FAMOTIDINE 20 MG
TABLET ORAL
COMMUNITY
End: 2023-08-04

## 2019-07-09 ASSESSMENT — MIFFLIN-ST. JEOR: SCORE: 1543.48

## 2019-07-09 ASSESSMENT — PAIN SCALES - GENERAL: PAINLEVEL: MODERATE PAIN (5)

## 2019-07-09 NOTE — PROGRESS NOTES
Female Physical Note    Concerns today: Nursing Notes:   AmeniaMarylou., LPN  2019  3:49 PM  Signed  Patient is here for annual physical. Has a few questions.   Marylou Huitronjordana LEVINE .............2019     3:19 PM      Patient's last menstrual period was 2019 (exact date).  Medication Reconciliation: complete    Marylou AREVALO Amenia, LPN  2019 3:23 PM  39-year-old G2, P2 presents for annual preventive physical.  She has a few concerns.  Father  of colon cancer age 71  2 brothers, healthy.  She is wondering if this impacts her risk of colon cancer.  She denies blood in her stools or change in caliber.    Regular menses, flow is heavy with some clots the first 1 to 2 days.  Neck pain x 6m opnths, seeing chiropractor      ROS:  CONSTITUTIONAL: no fatigue, no unexpected change in weight  SKIN: no worrisome rashes, no worrisome moles, no worrisome lesions  EYES: no acute vision problems or changes  ENT: no ear problems, no mouth problems, no throat problems  RESP: no significant cough, no shortness of breath  CV: no chest pain, no palpitations, no new or worsening peripheral edema  GI: no nausea, no vomiting, no constipation, no diarrhea  MUSCULOSKELETAL:see hPi  ENDOCRINE: no temperature intolerance, no skin/hair changes  PSYCHIATRIC: NEGATIVE for changes in mood or affect    Sexually Active: Yes  Sexual concerns: No   Contraception:not needed   P: 2  Menarche:  Patient's last menstrual period was 2019 (exact date). Menses: q 30 days  STD History: Neg  Last Pap Smear Date:  normal  Abnormal Pap History: None    Patient Active Problem List   Diagnosis     Eczema of left external ear       Current Outpatient Medications   Medication Sig Dispense Refill     ferrous sulfate (IRON) 325 (65 FE) MG tablet Take 325 mg by mouth 2 times daily (with meals)       Multiple Vitamins-Minerals (MULTIVITAMIN ADULTS PO)        triamcinolone (KENALOG) 0.1 % cream Apply topically 3 times daily       Vitamin D,  "Cholecalciferol, 1000 units CAPS          History reviewed. No pertinent past medical history.     Family History     Problem (# of Occurrences) Relation (Name,Age of Onset)    Colon Cancer (1) Father (73): Cancer-colon, age 73    Family History Negative (1) Brother: Good Health          Problem List Medication List and Allergy List were reviewed.    Patient is an established patient of this clinic..    Social History     Tobacco Use     Smoking status: Never Smoker     Smokeless tobacco: Never Used   Substance Use Topics     Alcohol use: Yes     Comment: Alcoholic Drinks/day: wine few a week       Children ? yes     Has anyone hurt you physically, for example by pushing, hitting, slapping or kicking you or forcing you to have sex? Denies  Do you feel threatened or controlled by a partner, ex-partner or anyone in your life? Denies    RISK BEHAVIORS AND HEALTHY HABITS:  Tobacco Use/Smoking: None  Illicit Drug Use: None  Do you use alcohol? Yes  Number of drinking days a week 2  Diet (5-7 servings of fruits/veg daily): Yes   Exercise (30 min accumulated most days):Yes  Dental Care: Yes   Calcium 1500 mg/d:  Yes  Seat Belt Use: Yes     Pap/HPV cotest every 5 years for women 30-65   Testing not indicated   Colon CA Screening (>50-75 ):  Testing not indicated   CV Risk based on Pooled Cohort Risk:   Pooled Cohort Risk Calculator    Immunization History   Administered Date(s) Administered     Influenza (H1N1) 2010     Influenza (IIV3) PF 10/20/2010, 2011, 2012     Influenza Intranasal Vaccine 2009    Reviewed Immunization Record Today    EXAMINATION:   /70 (BP Location: Right arm, Patient Position: Sitting, Cuff Size: Adult Regular)   Pulse 54   Temp 99  F (37.2  C) (Tympanic)   Resp 18   Ht 1.784 m (5' 10.25\")   Wt 78.9 kg (174 lb)   LMP 2019 (Exact Date)   SpO2 99%   Breastfeeding? No   BMI 24.79 kg/m    GENERAL: healthy, alert and no distress  EYES: Eyes grossly " normal to inspection, extraocular movements - intact, and PERRL  HENT: ear canals- normal; TMs- normal; Nose- normal; Mouth- no ulcers, no lesions  NECK: no tenderness, no adenopathy, no asymmetry, no masses, no stiffness; thyroid- normal to palpation  RESP: lungs clear to auscultation - no rales, no rhonchi, no wheezes  BREAST: no masses, no tenderness, no nipple discharge, no palpable axillary masses or adenopathy  CV: regular rates and rhythm, normal S1 S2, no S3 or S4 and no murmur, no click or rub -  ABDOMEN: soft, no tenderness, no  hepatosplenomegaly, no masses, normal bowel sounds  MS: extremities- no gross deformities noted, no edema  SKIN: no suspicious lesions, no rashes  NEURO: strength and tone- normal, sensory exam- grossly normal, mentation- intact, speech- normal, reflexes- symmetric  BACK: no CVA tenderness, no paralumbar tenderness  PSYCH: Alert and oriented times 3; speech- coherent , normal rate and volume; able to articulate logical thoughts, able to abstract reason, no tangential thoughts, no hallucinations or delusions, affect- normal  LYMPHATICS: ant. cervical- normal, post. cervical- normal, axillary- normal, supraclavicular- normal, inguinal- normal    ASSESSMENT:  1. Annual physical exam    2. Low ferritin    3. Bilateral neck pain    4. Encounter for screening mammogram for breast cancer    5. Family history of high cholesterol          PLAN:  Patient is counseled on importance of regular self breast exams and mammograms every 1-2 years starting at age 40. Patient will proceed with pap smears every 3-5 years until age 65. Discussed importance of calcium and vitamin D supplementation and osteoporosis screening. Immunizations are updated based on CDC recommendations and patients desire. Reviewed importance of sunscreen, limit sun exposure and monitoring for changing moles with ABCDEs. Recommend seatbelt use and helmets with biking, skiing and ATV/Snowmobile use.    Patient Instructions    Check with insurance:  Do they cover Cologuard for colon cancer screening?  Do they cover 3D Mammogram for breast cancer screening?    Check neck xray  Start flexeril 10 mg at bedtime, muscle relaxant and continue stretches    Check cbc and ferritin, cholesterol panel and fasting blood sugar    Options for heavy menses include birth control pills, IUD or endometrial ablation      Huyen Tom MD

## 2019-07-09 NOTE — PATIENT INSTRUCTIONS
Check with insurance:  Do they cover Cologuard for colon cancer screening?  Do they cover 3D Mammogram for breast cancer screening?    Check neck xray  Start flexeril 10 mg at bedtime, muscle relaxant and continue stretches    Check cbc and ferritin, cholesterol panel and fasting blood sugar    Options for heavy menses include birth control pills, IUD or endometrial ablation

## 2019-07-09 NOTE — NURSING NOTE
Patient is here for annual physical. Has a few questions.   Marylou Yap LPN .............7/9/2019     3:19 PM      Patient's last menstrual period was 06/17/2019 (exact date).  Medication Reconciliation: complete    Marylou Yap LPN  7/9/2019 3:23 PM

## 2019-07-12 ENCOUNTER — TELEPHONE (OUTPATIENT)
Dept: FAMILY MEDICINE | Facility: OTHER | Age: 39
End: 2019-07-12

## 2019-07-12 DIAGNOSIS — Z83.42 FAMILY HISTORY OF HIGH CHOLESTEROL: ICD-10-CM

## 2019-07-12 DIAGNOSIS — R79.0 LOW FERRITIN: ICD-10-CM

## 2019-07-12 LAB
BASOPHILS # BLD AUTO: 0 10E9/L (ref 0–0.2)
BASOPHILS NFR BLD AUTO: 0.7 %
CHOLEST SERPL-MCNC: 148 MG/DL
DIFFERENTIAL METHOD BLD: NORMAL
EOSINOPHIL # BLD AUTO: 0.1 10E9/L (ref 0–0.7)
EOSINOPHIL NFR BLD AUTO: 3.1 %
ERYTHROCYTE [DISTWIDTH] IN BLOOD BY AUTOMATED COUNT: 12.4 % (ref 10–15)
FERRITIN SERPL-MCNC: 16 NG/ML (ref 23.9–336.2)
GLUCOSE SERPL-MCNC: 95 MG/DL (ref 70–105)
HCT VFR BLD AUTO: 39.2 % (ref 35–47)
HDLC SERPL-MCNC: 52 MG/DL (ref 23–92)
HGB BLD-MCNC: 13.1 G/DL (ref 11.7–15.7)
IMM GRANULOCYTES # BLD: 0 10E9/L (ref 0–0.4)
IMM GRANULOCYTES NFR BLD: 0.2 %
LDLC SERPL CALC-MCNC: 86 MG/DL
LYMPHOCYTES # BLD AUTO: 1.3 10E9/L (ref 0.8–5.3)
LYMPHOCYTES NFR BLD AUTO: 29.2 %
MCH RBC QN AUTO: 32.5 PG (ref 26.5–33)
MCHC RBC AUTO-ENTMCNC: 33.4 G/DL (ref 31.5–36.5)
MCV RBC AUTO: 97 FL (ref 78–100)
MONOCYTES # BLD AUTO: 0.4 10E9/L (ref 0–1.3)
MONOCYTES NFR BLD AUTO: 9.2 %
NEUTROPHILS # BLD AUTO: 2.6 10E9/L (ref 1.6–8.3)
NEUTROPHILS NFR BLD AUTO: 57.6 %
NONHDLC SERPL-MCNC: 96 MG/DL
PLATELET # BLD AUTO: 224 10E9/L (ref 150–450)
RBC # BLD AUTO: 4.03 10E12/L (ref 3.8–5.2)
TRIGL SERPL-MCNC: 49 MG/DL
WBC # BLD AUTO: 4.5 10E9/L (ref 4–11)

## 2019-07-12 PROCEDURE — 82947 ASSAY GLUCOSE BLOOD QUANT: CPT | Performed by: FAMILY MEDICINE

## 2019-07-12 PROCEDURE — 36415 COLL VENOUS BLD VENIPUNCTURE: CPT | Performed by: FAMILY MEDICINE

## 2019-07-12 PROCEDURE — 85025 COMPLETE CBC W/AUTO DIFF WBC: CPT | Performed by: FAMILY MEDICINE

## 2019-07-12 PROCEDURE — 80061 LIPID PANEL: CPT | Performed by: FAMILY MEDICINE

## 2019-07-12 PROCEDURE — 82728 ASSAY OF FERRITIN: CPT | Performed by: FAMILY MEDICINE

## 2019-07-12 NOTE — TELEPHONE ENCOUNTER
Left message to notify TYP is out of clinic, will call back with results when she returns Thursday.  Marylou Yap LPN .............7/12/2019     3:42 PM

## 2019-07-12 NOTE — LETTER
July 19, 2019      Coni Root  [address] [place] [address]        Dear ,    We are writing to inform you of your test results.      Cholesterol and blood sugar were normal.    Xray was essentially normal. Very minimal arthritic changes. I doubt this is the cause of your pain. I suspect it is muscular and recommend the muscle relaxant and anti-inflammatories. Could consider formal physical therapy.    Orders Only on 07/12/2019   Component Date Value Ref Range Status     Glucose 07/12/2019 95  70 - 105 mg/dL Final     Cholesterol 07/12/2019 148  <200 mg/dL Final     Triglycerides 07/12/2019 49  <150 mg/dL Final     HDL Cholesterol 07/12/2019 52  23 - 92 mg/dL Final     LDL Cholesterol Calculated 07/12/2019 86  <100 mg/dL Final    Desirable:       <100 mg/dl     Non HDL Cholesterol 07/12/2019 96  <130 mg/dL Final     Ferritin 07/12/2019 16* 23.9 - 336.2 ng/mL Final     WBC 07/12/2019 4.5  4.0 - 11.0 10e9/L Final     RBC Count 07/12/2019 4.03  3.8 - 5.2 10e12/L Final     Hemoglobin 07/12/2019 13.1  11.7 - 15.7 g/dL Final     Hematocrit 07/12/2019 39.2  35.0 - 47.0 % Final     MCV 07/12/2019 97  78 - 100 fl Final     MCH 07/12/2019 32.5  26.5 - 33.0 pg Final     MCHC 07/12/2019 33.4  31.5 - 36.5 g/dL Final     RDW 07/12/2019 12.4  10.0 - 15.0 % Final     Platelet Count 07/12/2019 224  150 - 450 10e9/L Final     Diff Method 07/12/2019 Automated Method   Final     % Neutrophils 07/12/2019 57.6  % Final     % Lymphocytes 07/12/2019 29.2  % Final     % Monocytes 07/12/2019 9.2  % Final     % Eosinophils 07/12/2019 3.1  % Final     % Basophils 07/12/2019 0.7  % Final     % Immature Granulocytes 07/12/2019 0.2  % Final     Absolute Neutrophil 07/12/2019 2.6  1.6 - 8.3 10e9/L Final     Absolute Lymphocytes 07/12/2019 1.3  0.8 - 5.3 10e9/L Final     Absolute Monocytes 07/12/2019 0.4  0.0 - 1.3 10e9/L Final     Absolute Eosinophils 07/12/2019 0.1  0.0 - 0.7 10e9/L Final     Absolute Basophils  07/12/2019 0.0  0.0 - 0.2 10e9/L Final     Abs Immature Granulocytes 07/12/2019 0.0  0 - 0.4 10e9/L Final       If you have any questions or concerns, please call the clinic at the number listed above.       Sincerely,        Huyen Tom MD

## 2019-07-18 NOTE — TELEPHONE ENCOUNTER
Patient notified. Patient is wondering about lab results, had done Friday.   Marylou Yap LPN........7/18/2019  9:41 AM

## 2019-07-19 ENCOUNTER — HOSPITAL ENCOUNTER (OUTPATIENT)
Dept: MAMMOGRAPHY | Facility: OTHER | Age: 39
Discharge: HOME | End: 2019-07-19
Attending: FAMILY MEDICINE | Admitting: FAMILY MEDICINE
Payer: COMMERCIAL

## 2019-07-19 DIAGNOSIS — Z12.31 ENCOUNTER FOR SCREENING MAMMOGRAM FOR BREAST CANCER: ICD-10-CM

## 2019-07-19 PROCEDURE — 77063 BREAST TOMOSYNTHESIS BI: CPT

## 2019-07-22 NOTE — TELEPHONE ENCOUNTER
Rerouting to Juan Pablo Tom MD for lab results completed on 07/12/19.  Please advise.    Marielena Streeter LPN............7/22/2019 10:34 AM

## 2019-07-23 ENCOUNTER — TELEPHONE (OUTPATIENT)
Dept: FAMILY MEDICINE | Facility: OTHER | Age: 39
End: 2019-07-23

## 2019-07-23 NOTE — TELEPHONE ENCOUNTER
After verifying pts name and date of birth with pt, pt was notified of message below. Pt is very upset stating she should not have to been seen again for this. Pt requesting to talk to a manager at this time and was transferred to Deana Bell RN supervision.  Cara Rojas

## 2019-07-23 NOTE — TELEPHONE ENCOUNTER
What can she do to help ease the pain. She has arthritis in her neck. Can she get PT for this?  Also calling for labs results.  Norma J. Gosselin LPN......  7/23/2019   9:36 AM

## 2019-07-23 NOTE — TELEPHONE ENCOUNTER
I have suggested multiple times via the past numerous phone messages, that the patient make a follow-up appointment. When she was seen last, it was for her annual preventive exam., We discussed her neck pain and I recommend a trial of NSAIDs, Flexeril and stretching. I sent a letter last week and recommended Physical therapy.    Huyen Tom MD

## 2019-07-23 NOTE — TELEPHONE ENCOUNTER
Recommend appointment to discuss as this was not the focus of her last visit (preventive physical)    Had recommended NSAIDS, flexeril, chiropractor.     Sent letter last week as well  Huyen Tom MD

## 2020-02-17 ENCOUNTER — OFFICE VISIT (OUTPATIENT)
Dept: FAMILY MEDICINE | Facility: OTHER | Age: 40
End: 2020-02-17
Attending: NURSE PRACTITIONER
Payer: COMMERCIAL

## 2020-02-17 ENCOUNTER — HOSPITAL ENCOUNTER (OUTPATIENT)
Dept: GENERAL RADIOLOGY | Facility: OTHER | Age: 40
End: 2020-02-17
Attending: NURSE PRACTITIONER
Payer: COMMERCIAL

## 2020-02-17 VITALS
OXYGEN SATURATION: 99 % | RESPIRATION RATE: 16 BRPM | HEART RATE: 81 BPM | TEMPERATURE: 97.3 F | HEIGHT: 70 IN | WEIGHT: 170 LBS | DIASTOLIC BLOOD PRESSURE: 74 MMHG | BODY MASS INDEX: 24.34 KG/M2 | SYSTOLIC BLOOD PRESSURE: 108 MMHG
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
## 2020-02-17 DIAGNOSIS — M53.3 PAIN IN THE COCCYX: ICD-10-CM

## 2020-02-17 DIAGNOSIS — M53.3 PAIN IN THE COCCYX: Primary | ICD-10-CM

## 2020-02-17 PROCEDURE — 99213 OFFICE O/P EST LOW 20 MIN: CPT | Performed by: NURSE PRACTITIONER

## 2020-02-17 PROCEDURE — 72220 X-RAY EXAM SACRUM TAILBONE: CPT

## 2020-02-17 ASSESSMENT — MIFFLIN-ST. JEOR: SCORE: 1525.33

## 2020-02-17 ASSESSMENT — PAIN SCALES - GENERAL: PAINLEVEL: EXTREME PAIN (9)

## 2020-02-17 NOTE — PATIENT INSTRUCTIONS
Aleve twice daily with food  May use tylenol every 6 hours as needed  Colace or Miralax daily for stools  Ice/heat  Will call with radiology report   None

## 2020-02-17 NOTE — PROGRESS NOTES
"HPI:    Coni Root is a 39 year old female who presents to clinic today for tailbone pain.  Yesterday she was sledding with her children and while she was on the sled she had a bump.  She flew off the sled and landed on her knees and chest.  She had immediate pain to her tailbone when she hit the bump on the slide.  She reports the pain has been persistent.  It is a sharp and shooting pain.  She has increased pain with sitting, getting dressed, bending and then standing up as well as coughing.  She has taken Advil and has used heat to the area.    History reviewed. No pertinent past medical history.      Current Outpatient Medications   Medication Sig Dispense Refill     cyclobenzaprine (FLEXERIL) 10 MG tablet Take 1 tablet (10 mg) by mouth nightly as needed for muscle spasms 30 tablet 0     ferrous sulfate (IRON) 325 (65 FE) MG tablet Take 325 mg by mouth 2 times daily (with meals)       Multiple Vitamins-Minerals (MULTIVITAMIN ADULTS PO)        triamcinolone (KENALOG) 0.1 % cream Apply topically 3 times daily       Vitamin D, Cholecalciferol, 1000 units CAPS          No Known Allergies    ROS:  Pertinent positives and negatives are noted in HPI.    EXAM:  /74 (BP Location: Right arm, Patient Position: Sitting, Cuff Size: Adult Regular)   Pulse 81   Temp 97.3  F (36.3  C) (Temporal)   Resp 16   Ht 1.784 m (5' 10.25\")   Wt 77.1 kg (170 lb)   LMP  (LMP Unknown)   SpO2 99%   Breastfeeding No   BMI 24.22 kg/m    General appearance: well appearing female, in no acute distress as long as she is standing in exam room, does not attempt to sit down   musculoskeletal: Tenderness over lower lumbar and sacral area with palpation, no bruising or swelling  Dermatological: no rashes or lesions  Psychological: normal affect, alert and pleasant  Xray: xray independently reviewed and no acute fracture pain to coccyx and sacral area due to sledding accident.  X-ray appears stable.  Appreciated; pending " radiology over-read    ASSESSMENT AND PLAN:    1. Pain in the coccyx    2. Injury due to sledding accident      Suspect soft tissue and bruising.  Recommend ice or heat, NSAIDs and sitting on doughnut or soft cushions until resolution.  Reviewed typical healing and signs and symptoms that would warrant follow-up.  She is very concerned about having a bowel movement due to the coccyx pain.  I recommend that she start Colace or MiraLAX to help soften her stools.      ROSS Cooley CNP..................2/17/2020 8:06 AM      This document was prepared using voice generated software.  While every attempt was made for accuracy, grammatical errors may exist.

## 2020-02-17 NOTE — NURSING NOTE
Patient presents to clinic today for tailbone pain. She states she was sledding yesterday and hit something hard and now tailbone is very painful.     No LMP recorded.  Medication Reconciliation: complete    Gerri Das LPN  2/17/2020 8:06 AM

## 2020-03-11 ENCOUNTER — HEALTH MAINTENANCE LETTER (OUTPATIENT)
Age: 40
End: 2020-03-11

## 2020-07-22 ENCOUNTER — OFFICE VISIT (OUTPATIENT)
Dept: FAMILY MEDICINE | Facility: OTHER | Age: 40
End: 2020-07-22
Attending: FAMILY MEDICINE
Payer: COMMERCIAL

## 2020-07-22 VITALS
TEMPERATURE: 98.2 F | HEART RATE: 60 BPM | SYSTOLIC BLOOD PRESSURE: 110 MMHG | BODY MASS INDEX: 24.62 KG/M2 | WEIGHT: 172.8 LBS | RESPIRATION RATE: 16 BRPM | DIASTOLIC BLOOD PRESSURE: 68 MMHG

## 2020-07-22 DIAGNOSIS — M79.672 LEFT FOOT PAIN: Primary | ICD-10-CM

## 2020-07-22 DIAGNOSIS — M72.2 PLANTAR FASCIITIS: ICD-10-CM

## 2020-07-22 PROCEDURE — 99213 OFFICE O/P EST LOW 20 MIN: CPT | Performed by: FAMILY MEDICINE

## 2020-07-22 ASSESSMENT — PAIN SCALES - GENERAL: PAINLEVEL: MODERATE PAIN (5)

## 2020-07-23 NOTE — PROGRESS NOTES
SUBJECTIVE:   Coni Root is a 40 year old female who presents to clinic today for the following health issues: Right foot pain    Patient arrives here because of right foot pain.  Started about a week ago after she has been walking.  States that she frequently walks the dog.  Pain is mainly located in the plantar surface of her foot but does extend a little bit lateral.  This did seem to improve.  But reoccurred after she went out for another walk.  She typically walks 2 miles.  Pain started and she was only able to walk three fourths of a mile.  No swelling no ecchymosis.        Patient Active Problem List    Diagnosis Date Noted     Eczema of left external ear 05/05/2017     Priority: Medium     History reviewed. No pertinent past medical history.   History reviewed. No pertinent surgical history.  Current Outpatient Medications   Medication Sig Dispense Refill     cyclobenzaprine (FLEXERIL) 10 MG tablet Take 1 tablet (10 mg) by mouth nightly as needed for muscle spasms 30 tablet 0     ferrous sulfate (IRON) 325 (65 FE) MG tablet Take 325 mg by mouth 2 times daily (with meals)       Multiple Vitamins-Minerals (MULTIVITAMIN ADULTS PO)        triamcinolone (KENALOG) 0.1 % cream Apply topically 3 times daily       Vitamin D, Cholecalciferol, 1000 units CAPS        No Known Allergies    Review of Systems     OBJECTIVE:     /68   Pulse 60   Temp 98.2  F (36.8  C)   Resp 16   Wt 78.4 kg (172 lb 12.8 oz)   BMI 24.62 kg/m    Body mass index is 24.62 kg/m .  Physical Exam  Musculoskeletal:      Comments: Exam does show some pain along the plantar surfaces.  No pain along the fifth metatarsal.   Neurological:      Mental Status: She is alert.         Diagnostic Test Results:  none     ASSESSMENT/PLAN:         1. Left foot pain/plantar fasciitis  Recommend stretching.  Ice.  She has also been using heat and I advised her to avoid it.  We also discussed heel cups.  Good shoes.  Orthotics if needed and  does not improve.        Angel Steen MD  [facility]

## 2020-10-19 ENCOUNTER — OFFICE VISIT (OUTPATIENT)
Dept: FAMILY MEDICINE | Facility: OTHER | Age: 40
End: 2020-10-19
Attending: FAMILY MEDICINE
Payer: COMMERCIAL

## 2020-10-19 VITALS
WEIGHT: 175.5 LBS | BODY MASS INDEX: 24.57 KG/M2 | OXYGEN SATURATION: 99 % | DIASTOLIC BLOOD PRESSURE: 66 MMHG | HEIGHT: 71 IN | HEART RATE: 64 BPM | TEMPERATURE: 99 F | SYSTOLIC BLOOD PRESSURE: 110 MMHG | RESPIRATION RATE: 18 BRPM

## 2020-10-19 DIAGNOSIS — M54.2 CHRONIC NECK PAIN: ICD-10-CM

## 2020-10-19 DIAGNOSIS — N92.0 MENORRHAGIA WITH REGULAR CYCLE: ICD-10-CM

## 2020-10-19 DIAGNOSIS — Z12.31 VISIT FOR SCREENING MAMMOGRAM: ICD-10-CM

## 2020-10-19 DIAGNOSIS — Z23 NEED FOR TDAP VACCINATION: ICD-10-CM

## 2020-10-19 DIAGNOSIS — R79.0 LOW FERRITIN: ICD-10-CM

## 2020-10-19 DIAGNOSIS — L30.9 ECZEMA, UNSPECIFIED TYPE: ICD-10-CM

## 2020-10-19 DIAGNOSIS — R10.2 PELVIC PAIN IN FEMALE: ICD-10-CM

## 2020-10-19 DIAGNOSIS — G89.29 CHRONIC NECK PAIN: ICD-10-CM

## 2020-10-19 DIAGNOSIS — Z12.4 SCREENING FOR CERVICAL CANCER: ICD-10-CM

## 2020-10-19 DIAGNOSIS — M54.2 BILATERAL NECK PAIN: ICD-10-CM

## 2020-10-19 DIAGNOSIS — N89.8 VAGINAL DISCHARGE: ICD-10-CM

## 2020-10-19 DIAGNOSIS — Z00.00 HEALTH CARE MAINTENANCE: Primary | ICD-10-CM

## 2020-10-19 LAB
BASOPHILS # BLD AUTO: 0 10E9/L (ref 0–0.2)
BASOPHILS NFR BLD AUTO: 0.5 %
DIFFERENTIAL METHOD BLD: NORMAL
EOSINOPHIL # BLD AUTO: 0.1 10E9/L (ref 0–0.7)
EOSINOPHIL NFR BLD AUTO: 1.4 %
ERYTHROCYTE [DISTWIDTH] IN BLOOD BY AUTOMATED COUNT: 12.2 % (ref 10–15)
FERRITIN SERPL-MCNC: 8 NG/ML (ref 23.9–336.2)
HCT VFR BLD AUTO: 39 % (ref 35–47)
HGB BLD-MCNC: 12.9 G/DL (ref 11.7–15.7)
IMM GRANULOCYTES # BLD: 0 10E9/L (ref 0–0.4)
IMM GRANULOCYTES NFR BLD: 0.2 %
LYMPHOCYTES # BLD AUTO: 1.2 10E9/L (ref 0.8–5.3)
LYMPHOCYTES NFR BLD AUTO: 20.8 %
MCH RBC QN AUTO: 31 PG (ref 26.5–33)
MCHC RBC AUTO-ENTMCNC: 33.1 G/DL (ref 31.5–36.5)
MCV RBC AUTO: 94 FL (ref 78–100)
MONOCYTES # BLD AUTO: 0.4 10E9/L (ref 0–1.3)
MONOCYTES NFR BLD AUTO: 6.9 %
NEUTROPHILS # BLD AUTO: 4.2 10E9/L (ref 1.6–8.3)
NEUTROPHILS NFR BLD AUTO: 70.2 %
PLATELET # BLD AUTO: 256 10E9/L (ref 150–450)
RBC # BLD AUTO: 4.16 10E12/L (ref 3.8–5.2)
SPECIMEN SOURCE: NORMAL
TSH SERPL DL<=0.05 MIU/L-ACNC: 3.09 IU/ML (ref 0.34–5.6)
WBC # BLD AUTO: 5.9 10E9/L (ref 4–11)
WET PREP SPEC: NORMAL

## 2020-10-19 PROCEDURE — 36415 COLL VENOUS BLD VENIPUNCTURE: CPT | Mod: ZL | Performed by: FAMILY MEDICINE

## 2020-10-19 PROCEDURE — 82728 ASSAY OF FERRITIN: CPT | Mod: ZL | Performed by: FAMILY MEDICINE

## 2020-10-19 PROCEDURE — 90471 IMMUNIZATION ADMIN: CPT | Performed by: FAMILY MEDICINE

## 2020-10-19 PROCEDURE — 90715 TDAP VACCINE 7 YRS/> IM: CPT | Performed by: FAMILY MEDICINE

## 2020-10-19 PROCEDURE — 87624 HPV HI-RISK TYP POOLED RSLT: CPT | Mod: ZL | Performed by: FAMILY MEDICINE

## 2020-10-19 PROCEDURE — 87210 SMEAR WET MOUNT SALINE/INK: CPT | Mod: ZL | Performed by: FAMILY MEDICINE

## 2020-10-19 PROCEDURE — 85025 COMPLETE CBC W/AUTO DIFF WBC: CPT | Mod: ZL | Performed by: FAMILY MEDICINE

## 2020-10-19 PROCEDURE — 99396 PREV VISIT EST AGE 40-64: CPT | Mod: 25 | Performed by: FAMILY MEDICINE

## 2020-10-19 PROCEDURE — G0123 SCREEN CERV/VAG THIN LAYER: HCPCS | Performed by: FAMILY MEDICINE

## 2020-10-19 PROCEDURE — 84443 ASSAY THYROID STIM HORMONE: CPT | Mod: ZL | Performed by: FAMILY MEDICINE

## 2020-10-19 RX ORDER — CYCLOBENZAPRINE HCL 10 MG
10 TABLET ORAL
Qty: 30 TABLET | Refills: 11 | Status: SHIPPED | OUTPATIENT
Start: 2020-10-19 | End: 2023-08-04

## 2020-10-19 RX ORDER — TRIAMCINOLONE ACETONIDE 1 MG/G
CREAM TOPICAL 3 TIMES DAILY
Qty: 30 G | Refills: 3 | Status: SHIPPED | OUTPATIENT
Start: 2020-10-19 | End: 2021-11-05

## 2020-10-19 ASSESSMENT — ENCOUNTER SYMPTOMS
NERVOUS/ANXIOUS: 0
ARTHRALGIAS: 0
BACK PAIN: 0
SHORTNESS OF BREATH: 0
NECK PAIN: 1
FEVER: 0
CHILLS: 0

## 2020-10-19 ASSESSMENT — MIFFLIN-ST. JEOR: SCORE: 1549.25

## 2020-10-19 ASSESSMENT — PAIN SCALES - GENERAL: PAINLEVEL: MILD PAIN (2)

## 2020-10-19 NOTE — PROGRESS NOTES
SUBJECTIVE:   Nursing Notes:   Marielena Streeter LPN  10/19/2020  2:59 PM  Sign at exiting of workspace  Patient presents to clinic for physical exam.  Medication Reconciliation: complete    Marielena Streeter LPN        Coni Root is a 40 year old female who presents to clinic today for a physical.    Periods have been different for the past 3 months.  During the time of her ovulation for the past 3 months, has had a clumpy, white discharge, which smells bad.  Has had some itching and irritation with it as well.  Her periods have been very heavy for the past 3 months as well for the first 4 days.  The heavy periods have been going on for over 10 years, but worse recently.  Has to change tampon every hour during the first 4 days and at least 2 times overnight due to bleeding through both tampon and pad.  Feels more irritable and angry around the time of her menses.  She got furloughed from her job as a  for a [email or web address] line.  She enjoyed having more time with her family as a result.  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  In the past 4 months, she feels a painful swelling on the side she ovulates.  Years ago, she had a ovarian cyst that burst and was very painful.  She feels like the pain is bad enough that she cannot function for a couple of days when it is the worst.  She has never taken any hormonal birth control.  Her dad had colon cancer, but otherwise there were no other Gynecologic cancers in her family.    Has had some issues with neck pain.  Goes to Chiropractor.  Has a device to stretch her neck out.  Has limited range of motion of her neck.  Struggles to find a comfortable position to sleep in.  No radicular symptoms.  No weakness of hand/.      Has had a rash at the top of her gluteal cleft. It is very itchy.  Has been present for about 4 weeks.  Has tried an over the  counter lotrimin cream.  Helps only temporarily to reduce itching.  She has a history of eczema on other areas of her body previously.  No prior history of psoriasis.    HPI    I personally reviewed medications/allergies/history listed below:    Patient Active Problem List    Diagnosis Date Noted     Eczema, unspecified type 2017     Priority: Medium     History reviewed. No pertinent past medical history.   History reviewed. No pertinent surgical history.  Family History   Problem Relation Age of Onset     Colon Cancer Father 73        Cancer-colon, age 73     Family History Negative Brother         Good Health     Social History     Tobacco Use     Smoking status: Never Smoker     Smokeless tobacco: Never Used   Substance Use Topics     Alcohol use: Yes     Comment: Alcoholic Drinks/day: wine few a week     Social History     Social History Narrative    , 2 kids (boy age 13, daughter age 11 as of 10/2020).  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  works for school district.     Current Outpatient Medications   Medication Sig Dispense Refill     cyclobenzaprine (FLEXERIL) 10 MG tablet Take 1 tablet (10 mg) by mouth nightly as needed for muscle spasms 30 tablet 11     ferrous sulfate (IRON) 325 (65 FE) MG tablet Take 325 mg by mouth 2 times daily (with meals)       Multiple Vitamins-Minerals (MULTIVITAMIN ADULTS PO)        triamcinolone (KENALOG) 0.1 % external cream Apply topically 3 times daily 30 g 3     Vitamin D, Cholecalciferol, 1000 units CAPS        No Known Allergies    Review of Systems   Constitutional: Negative for chills and fever.   Respiratory: Negative for shortness of breath.    Cardiovascular: Negative for peripheral edema.   Genitourinary: Positive for menstrual problem and vaginal discharge.   Musculoskeletal: Positive for neck pain. Negative for arthralgias and back pain.  "  Psychiatric/Behavioral: Negative for mood changes. The patient is not nervous/anxious.         OBJECTIVE:     /66 (BP Location: Right arm, Patient Position: Sitting, Cuff Size: Adult Regular)   Pulse 64   Temp 99  F (37.2  C) (Tympanic)   Resp 18   Ht 1.791 m (5' 10.5\")   Wt 79.6 kg (175 lb 8 oz)   LMP  (LMP Unknown)   SpO2 99%   Breastfeeding No   BMI 24.83 kg/m    Body mass index is 24.83 kg/m .  Physical Exam  Constitutional:       General: She is not in acute distress.     Appearance: Normal appearance. She is well-developed.   HENT:      Head: Normocephalic.      Right Ear: Tympanic membrane and external ear normal.      Left Ear: Tympanic membrane and external ear normal.      Nose: Nose normal. No congestion or rhinorrhea.      Mouth/Throat:      Mouth: Mucous membranes are moist.      Pharynx: No oropharyngeal exudate or posterior oropharyngeal erythema.   Eyes:      General:         Right eye: No discharge.         Left eye: No discharge.      Extraocular Movements: Extraocular movements intact.      Conjunctiva/sclera: Conjunctivae normal.      Pupils: Pupils are equal, round, and reactive to light.   Neck:      Musculoskeletal: Normal range of motion and neck supple.      Thyroid: No thyromegaly.      Trachea: No tracheal deviation.   Cardiovascular:      Rate and Rhythm: Normal rate and regular rhythm.      Pulses: Normal pulses.      Heart sounds: Normal heart sounds, S1 normal and S2 normal. No murmur. No friction rub. No gallop. No S3 or S4 sounds.    Pulmonary:      Effort: Pulmonary effort is normal. No respiratory distress.      Breath sounds: Normal breath sounds. No wheezing, rhonchi or rales.      Comments: Breast exam:  No masses palpable bilaterally.  No skin changes, tethering or axillary lymphadenopathy bilaterally.    Abdominal:      General: Bowel sounds are normal. There is no distension.      Palpations: Abdomen is soft. There is no mass.      Tenderness: There is no " abdominal tenderness.   Genitourinary:     Comments: Pelvic Exam:  Vulva: No external lesions, normal hair distribution, no adenopathy  Vagina: Moist, pink, no abnormal discharge, well rugated, no lesions  Cervix: Pap smear & wet prep obtained, parous, smooth, pink, no visible lesions  Uterus: Normal size, anteverted, non-tender, mobile  Ovaries: No mass, non-tender, mobile  Musculoskeletal: Normal range of motion.   Lymphadenopathy:      Cervical: No cervical adenopathy.   Skin:     General: Skin is warm and dry.      Comments: Eczematous rash at the top of her gluteal cleft is noted.   Neurological:      Mental Status: She is alert and oriented to person, place, and time.      Motor: No abnormal muscle tone.      Deep Tendon Reflexes: Reflexes are normal and symmetric.   Psychiatric:         Thought Content: Thought content normal.         Judgment: Judgment normal.           PHQ-2 Score:     PHQ-2 ( 1999 Pfizer) 10/19/2020 7/22/2020   Q1: Little interest or pleasure in doing things 0 0   Q2: Feeling down, depressed or hopeless 0 0   PHQ-2 Score 0 0         I personally reviewed results withpatient as listed below:   Diagnostic Test Results:  none    ASSESSMENT/PLAN:       ICD-10-CM    1. Health care maintenance  Z00.00    2. Need for Tdap vaccination  Z23 TDAP VACCINE (Adacel, Boostrix)  [[id number]]   3. Visit for screening mammogram  Z12.31 MA Screen Bilateral w/Shayne   4. Screening for cervical cancer  Z12.4 Pap Screen Thin Prep with HPV - recommended age 30 - 65 years (select HPV order below)     HPV High Risk Types DNA Cervical   5. Vaginal discharge  N89.8 Wet Prep, Genital   6. Menorrhagia with regular cycle  N92.0 TSH Reflex GH     US Pelvis Complete without Transvaginal     CBC with platelets differential     OB/GYN REFERRAL     CBC with platelets differential     TSH Reflex GH   7. Pelvic pain in female  R10.2 OB/GYN REFERRAL   8. Chronic neck pain  M54.2 PHYSICAL THERAPY REFERRAL    G89.29    9. Bilateral  neck pain  M54.2 cyclobenzaprine (FLEXERIL) 10 MG tablet   10. Eczema, unspecified type  L30.9 triamcinolone (KENALOG) 0.1 % external cream   11. Low ferritin  R79.0 Ferritin     Ferritin       1.  Pap/HPV completed today as noted above.  Mammogram ordered as noted below.  Tdap updated today.  Flu shot has already been received this season.  2.  See #1.  3.  See #1.  4.  See #1.  5.  Wet prep was also obtained and is pending.  6.  We will obtain labs as above.  Ultrasound of pelvis also ordered.  Discussed briefly of options of hormonal birth control, hormonal IUDs, endometrial ablation versus hysterectomy for treatment of menorrhagia symptoms.  She is done having children.  She is very tired of dealing with this issue and might want to do something more permanent.  She wishes to discuss this further with OB/GYN.  Referral placed.  7.  Pelvic ultrasound ordered as above.  Also referred to OB/GYN for discussion of this issue as well.  8.  Gave option of trial of physical therapy versus further imaging with MRI.  She would like to try for some physical therapy to see if this helps.  Discussed that if she tries this for several weeks and is not having any progress, could contact us for MRI to be ordered.  10.  Trial of triamcinolone cream on the rash on her gluteal region.  If not improving, she may notify us.  11.  With her past history of low ferritin and her heavy periods, ferritin was also obtained with labs in addition to CBC as noted above.    Tawny Sherman MD  [facility] AND \[facility] [place] [facility]\""    Portions of this dictation were created using the Dragon Nuance voice recognition system. Proofreading was completed but there may be errors in text.

## 2020-10-19 NOTE — NURSING NOTE
Patient presents to clinic for physical exam.  Medication Reconciliation: complete    Marielena Streeter, BOZENAN

## 2020-10-21 LAB
COPATH REPORT: NORMAL
PAP: NORMAL

## 2020-10-26 LAB
FINAL DIAGNOSIS: NORMAL
HPV HR 12 DNA CVX QL NAA+PROBE: NEGATIVE
HPV16 DNA SPEC QL NAA+PROBE: NEGATIVE
HPV18 DNA SPEC QL NAA+PROBE: NEGATIVE
SPECIMEN DESCRIPTION: NORMAL
SPECIMEN SOURCE CVX/VAG CYTO: NORMAL

## 2020-11-04 ENCOUNTER — OFFICE VISIT (OUTPATIENT)
Dept: OBGYN | Facility: OTHER | Age: 40
End: 2020-11-04
Attending: FAMILY MEDICINE
Payer: COMMERCIAL

## 2020-11-04 ENCOUNTER — HOSPITAL ENCOUNTER (OUTPATIENT)
Dept: ULTRASOUND IMAGING | Facility: OTHER | Age: 40
End: 2020-11-04
Attending: FAMILY MEDICINE
Payer: COMMERCIAL

## 2020-11-04 VITALS
WEIGHT: 174 LBS | SYSTOLIC BLOOD PRESSURE: 110 MMHG | DIASTOLIC BLOOD PRESSURE: 70 MMHG | HEART RATE: 60 BPM | BODY MASS INDEX: 24.61 KG/M2

## 2020-11-04 DIAGNOSIS — N92.0 MENORRHAGIA WITH REGULAR CYCLE: ICD-10-CM

## 2020-11-04 DIAGNOSIS — R10.2 PELVIC PAIN IN FEMALE: ICD-10-CM

## 2020-11-04 DIAGNOSIS — N92.0 MENORRHAGIA WITH REGULAR CYCLE: Primary | ICD-10-CM

## 2020-11-04 PROCEDURE — 76856 US EXAM PELVIC COMPLETE: CPT

## 2020-11-04 PROCEDURE — 99203 OFFICE O/P NEW LOW 30 MIN: CPT | Performed by: OBSTETRICS & GYNECOLOGY

## 2020-11-04 RX ORDER — NORGESTIMATE AND ETHINYL ESTRADIOL 0.25-0.035
1 KIT ORAL DAILY
Qty: 84 TABLET | Refills: 4 | Status: SHIPPED | OUTPATIENT
Start: 2020-11-04 | End: 2021-02-24

## 2020-11-04 SDOH — HEALTH STABILITY: MENTAL HEALTH: HOW OFTEN DO YOU HAVE 6 OR MORE DRINKS ON ONE OCCASION?: NOT ASKED

## 2020-11-04 SDOH — HEALTH STABILITY: MENTAL HEALTH: HOW OFTEN DO YOU HAVE A DRINK CONTAINING ALCOHOL?: NOT ASKED

## 2020-11-04 SDOH — HEALTH STABILITY: MENTAL HEALTH: HOW MANY STANDARD DRINKS CONTAINING ALCOHOL DO YOU HAVE ON A TYPICAL DAY?: NOT ASKED

## 2020-11-04 ASSESSMENT — PAIN SCALES - GENERAL: PAINLEVEL: NO PAIN (0)

## 2020-11-04 NOTE — NURSING NOTE
Chief Complaint   Patient presents with     Consult     Menorrhagia and Dysmenorrhea      States the menorrhagia, dysmenorrhea and mood issues have been going on for at least 4 months.   Nicolasa Zacarias LPN........................11/4/2020  9:09 AM     Medication Reconciliation: completed   Nicolasa Zacarias LPN  11/4/2020 9:09 AM

## 2020-11-05 NOTE — PROGRESS NOTES
Gynecology Visit    CC: heavy menses    HPI:    Coni Root is a 40 year old , here for the above concerns. She reports her periods have always been heavy, monthly, lasting 7 days with 2 days of heavy flow. Usually not much cramping. However, since  her periods have been much worse. They are still monthly and lasting 7 days but now 4 days are very heavy- changing a super tampon hourly and soaking through large overnight pads. She needs to get up several times a night to change her menstrual products because of overflow. She has been more irritable. For about 3 days around ovulation, she is also having cramping and an increased thick, chunky discharge with a foul odor and labial edema that is uncomfortable. It feels like a yeast infection but spontaneously resolves after a few days. This only started since . She has no urinary incontinence. She is sexually active, using vasectomy for contraception. She had a recent normal TSH.     OBHx  OB History    Para Term  AB Living   2 2 2 0 0 2   SAB TAB Ectopic Multiple Live Births   0 0 0 0 2      # Outcome Date GA Lbr Abdon/2nd Weight Sex Delivery Anes PTL Lv   2 Term      Vag-Spont   ISAAC   1 Term      Vag-Spont   ISAAC       PMHx: History reviewed. No pertinent past medical history.   PSHx:   Past Surgical History:   Procedure Laterality Date     NO HISTORY OF SURGERY        Meds:   Current Outpatient Medications   Medication     cyclobenzaprine (FLEXERIL) 10 MG tablet     ferrous sulfate (IRON) 325 (65 FE) MG tablet     Multiple Vitamins-Minerals (MULTIVITAMIN ADULTS PO)     norgestimate-ethinyl estradiol (ORTHO-CYCLEN) 0.25-35 MG-MCG tablet     triamcinolone (KENALOG) 0.1 % external cream     Vitamin D, Cholecalciferol, 1000 units CAPS     No current facility-administered medications for this visit.      Allergies:   No Known Allergies    SocHx:   Social History     Tobacco Use     Smoking status: Never Smoker     Smokeless tobacco: Never  Used   Substance Use Topics     Alcohol use: Yes     Comment: Occ.     Drug use: Never     Comment: Drug use: No     Lives with her  and 2 kids. 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    FamHx:   Family History   Problem Relation Age of Onset     Colon Cancer Father 73        Cancer-colon, age 73     Family History Negative Brother         Good Health        Physical Exam  /70 (BP Location: Right arm, Patient Position: Sitting, Cuff Size: Adult Large)   Pulse 60   Wt 78.9 kg (174 lb)   LMP 10/21/2020 (Exact Date)   Breastfeeding No   BMI 24.61 kg/m    Gen: Well-appearing, NAD  Resp: nonlabored  Psych: appropriate mood and affect    Pelvic US:  FINDINGS:   MEASUREMENTS:  Uterus: 9.4 x 5.7 x 4.2 cm  Endometrium: 13 mm in thickness  UTERUS: The uterus is normal in size and contour. The endometrium is  normal in thickness. There is no myometrial mass. Trace endocervical  fluid is seen.  ADNEXA: A 2.8 cm simple appearing cyst or dominant follicle is present  in the left ovary.  MISC: There is no free fluid in the pelvis.                                                       IMPRESSION: Essentially unremarkable pelvic ultrasound.         Assessment/Plan  Coni Root is a 40 year old  female here for heavy menses, desmond-ovulation vaginal discharge. She has already had a normal pelvic ultrasound and thyroid screening. Discussed management of her menses. Due to her ovulation related symptoms, discussed Depo vs OCPs to really determine if this is hormonally related. Discussed limitations of IUD and surgical management as this may not address her ovulation symptoms due to lack of ovarian suppression. She wants to try OCPs for now and will reassess symptoms in 3 months. If she is not responding to OCPs, will need to do an endometrial biopsy, although she does not have risk factors for hyperplasia or malignancy.     30 minutes were spent  with the patient with >50% spent counseling.      Alessandra Luevano MD  OB/GYN  11/5/2020 11:35 AM

## 2020-11-06 ENCOUNTER — HOSPITAL ENCOUNTER (OUTPATIENT)
Dept: PHYSICAL THERAPY | Facility: OTHER | Age: 40
Setting detail: THERAPIES SERIES
End: 2020-11-06
Attending: FAMILY MEDICINE
Payer: COMMERCIAL

## 2020-11-06 DIAGNOSIS — G89.29 CHRONIC NECK PAIN: ICD-10-CM

## 2020-11-06 DIAGNOSIS — M54.2 CHRONIC NECK PAIN: ICD-10-CM

## 2020-11-06 PROCEDURE — 97110 THERAPEUTIC EXERCISES: CPT | Mod: GP

## 2020-11-06 PROCEDURE — 97162 PT EVAL MOD COMPLEX 30 MIN: CPT | Mod: GP

## 2020-11-06 PROCEDURE — 97140 MANUAL THERAPY 1/> REGIONS: CPT | Mod: GP,XU

## 2020-11-06 NOTE — PROGRESS NOTES
[place] [facility]     11/06/20 1200   General Information   Type of Visit Initial OP Ortho PT Evaluation   Start of Care Date 11/06/20   Referring Physician Dr. Sherman   Patient/Family Goals Statement decrease chronic pain   Orders Evaluate and Treat   Date of Order 10/19/20   Certification Required? No   Medical Diagnosis Chronic neck pain   Surgical/Medical history reviewed Yes   Body Part(s)   Body Part(s) Cervical Spine   Presentation and Etiology   Pertinent history of current problem (include personal factors and/or comorbidities that impact the POC) Margaret comes in with chronic neck pain for about 3 years.  She has a constant achy pain on L posterior neck.  She can tell that she has lost ROM and gets shooting pain in neck with turning head much like with driving looking for blind spots and turning to look for traffic.  She also has pain trying getting comfortable in bed.  Makes her irritated because of the pain. Chiro temp relief for a day.  Uses a foam triangular pillow from chiro 3-4 times per week, massage balls 3-4 times per week laying on floor help otherwise leads to headaches. Exercise classes at [facility] 3-4 days per week: cardio kick, cardio hit, TRX and bike, insane (cardio and core).  Core worse on neck can't hold head up.  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  She is mostly sitting on computer 40 hr per week. Was at full stand up desk with good variety of movement.  Mostly sitting now but not any worse in 3 months since switching jobs.    Impairments A. Pain;D. Decreased ROM;E. Decreased flexibility;N. Headaches   Functional Limitations perform activities of daily living;perform required work activities;perform desired leisure / sports activities   Symptom Location neck   How/Where did it occur From insidious onset   Onset date of current episode/exacerbation 11/06/17   Chronicity Chronic   Pain rating (0-10 point  scale) Best (/10);Worst (/10)   Best (/10) 2   Worst (/10) 9   Pain quality A. Sharp;B. Dull;C. Aching   Frequency of pain/symptoms A. Constant   Pain/symptoms exacerbated by E. Rest;G. Certain positions;L. Work tasks;M. Other   Pain exacerbation comment driving and checking blind spots   Pain/symptoms eased by G. Heat;H. Cold;K. Other   Pain eased by comment chiropractic temporary improvement   Current / Previous Interventions   Diagnostic Tests: X-ray   X-ray Results Results   X-ray results 7/9/19: FINDINGS: No acute fracture or subluxation is seen. The cervical lordosis is straightened. Alignment is maintained. The odontoid is intact. Mild disc height loss is present at C5-6.  IMPRESSION: Minimal degenerative changes of the cervical spine.   Current Level of Function   Patient role/employment history A. 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 No   Have you fallen and had an injury in the past year? No   Is patient a fall risk? No   Abuse Screen (yes response referral indicated)   Feels Unsafe at Home or Work/School no   Feels Threatened by Someone no   Cervical Spine   Cervical Flexion ROM 34   Cervical Extension ROM 30   Cervical Right Side Bending ROM 30   Cervical Left Side Bending ROM 30   Cervical Right Rotation ROM 53   Cervical Left Rotation ROM 33   Cervical/Thoracic/Shoulder ROM Comments full ROM, no c/o weakness   Segmental Mobility-Cervical upper cervical tightness into R sideglide, improved with stretching   Palpation tight in L scalenes and sub occipitals   Planned Therapy Interventions   Planned Therapy Interventions manual therapy;joint mobilization;neuromuscular re-education;ROM;strengthening;stretching   Planned Modality Interventions   Planned Modality Interventions Ultrasound;Traction   Planned Modality Interventions Comments if indicated   Clinical Impression   Criteria for  Skilled Therapeutic Interventions Met yes, treatment indicated   PT Diagnosis cervical pain and impaired movement   Influenced by the following impairments impaired ROM, impaired position, pain   Functional limitations due to impairments impaired driving, impaired work, impaired sleeping   Clinical Presentation Evolving/Changing   Clinical Presentation Rationale chronic pain, age, active lifestyle   Clinical Decision Making (Complexity) Moderate complexity   Therapy Frequency 2 times/Week   Predicted Duration of Therapy Intervention (days/wks) 8 weeks   Risk & Benefits of therapy have been explained Yes   Patient, Family & other staff in agreement with plan of care Yes   Education Assessment   Barriers to Learning No barriers   ORTHO GOALS   PT Ortho Eval Goals 1;2;3   Ortho Goal 1   Goal Identifier driving   Goal Description Pt increase cervical rotation to 50 deg or better B to improve safety with driving in 8 weeks.    Target Date 01/01/21   Ortho Goal 2   Goal Identifier sleep   Goal Description Pt decrease pain with laying through night to 4/10 or less in 8 weeks.   Target Date 01/01/21   Ortho Goal 3   Goal Identifier HEP   Goal Description Pt independent with further progression of HEP to manage any further sx at 8 weeks.    Target Date 01/01/21   Total Evaluation Time   PT Ney, Moderate Complexity Minutes (56269) 30

## 2020-11-10 ENCOUNTER — HOSPITAL ENCOUNTER (OUTPATIENT)
Dept: PHYSICAL THERAPY | Facility: OTHER | Age: 40
Setting detail: THERAPIES SERIES
End: 2020-11-10
Attending: FAMILY MEDICINE
Payer: COMMERCIAL

## 2020-11-10 PROCEDURE — 97140 MANUAL THERAPY 1/> REGIONS: CPT | Mod: GP

## 2020-11-10 PROCEDURE — 97110 THERAPEUTIC EXERCISES: CPT | Mod: GP

## 2020-11-12 ENCOUNTER — HOSPITAL ENCOUNTER (OUTPATIENT)
Dept: PHYSICAL THERAPY | Facility: OTHER | Age: 40
Setting detail: THERAPIES SERIES
End: 2020-11-12
Attending: FAMILY MEDICINE
Payer: COMMERCIAL

## 2020-11-12 PROCEDURE — 97140 MANUAL THERAPY 1/> REGIONS: CPT | Mod: GP

## 2020-11-12 PROCEDURE — 97110 THERAPEUTIC EXERCISES: CPT | Mod: GP

## 2020-11-15 ENCOUNTER — MYC MEDICAL ADVICE (OUTPATIENT)
Dept: FAMILY MEDICINE | Facility: OTHER | Age: 40
End: 2020-11-15

## 2020-11-23 ENCOUNTER — HOSPITAL ENCOUNTER (OUTPATIENT)
Dept: PHYSICAL THERAPY | Facility: OTHER | Age: 40
Setting detail: THERAPIES SERIES
End: 2020-11-23
Attending: FAMILY MEDICINE
Payer: COMMERCIAL

## 2020-11-23 ENCOUNTER — HOSPITAL ENCOUNTER (OUTPATIENT)
Dept: MAMMOGRAPHY | Facility: OTHER | Age: 40
Discharge: HOME | End: 2020-11-23
Attending: FAMILY MEDICINE | Admitting: FAMILY MEDICINE
Payer: COMMERCIAL

## 2020-11-23 DIAGNOSIS — Z12.31 VISIT FOR SCREENING MAMMOGRAM: ICD-10-CM

## 2020-11-23 PROCEDURE — 97110 THERAPEUTIC EXERCISES: CPT | Mod: GP

## 2020-11-23 PROCEDURE — 77063 BREAST TOMOSYNTHESIS BI: CPT

## 2020-11-23 PROCEDURE — 97140 MANUAL THERAPY 1/> REGIONS: CPT | Mod: GP

## 2020-11-25 ENCOUNTER — HOSPITAL ENCOUNTER (OUTPATIENT)
Dept: MAMMOGRAPHY | Facility: OTHER | Age: 40
Discharge: HOME | End: 2020-11-25
Attending: FAMILY MEDICINE | Admitting: FAMILY MEDICINE
Payer: COMMERCIAL

## 2020-11-25 DIAGNOSIS — R92.8 ABNORMAL FINDING ON BREAST IMAGING: ICD-10-CM

## 2020-11-25 PROCEDURE — G0279 TOMOSYNTHESIS, MAMMO: HCPCS

## 2020-11-30 ENCOUNTER — HOSPITAL ENCOUNTER (OUTPATIENT)
Dept: PHYSICAL THERAPY | Facility: OTHER | Age: 40
Setting detail: THERAPIES SERIES
End: 2020-11-30
Attending: FAMILY MEDICINE
Payer: COMMERCIAL

## 2020-11-30 PROCEDURE — 97110 THERAPEUTIC EXERCISES: CPT | Mod: GP

## 2020-11-30 PROCEDURE — 97140 MANUAL THERAPY 1/> REGIONS: CPT | Mod: GP

## 2020-12-03 ENCOUNTER — HOSPITAL ENCOUNTER (OUTPATIENT)
Dept: PHYSICAL THERAPY | Facility: OTHER | Age: 40
Setting detail: THERAPIES SERIES
End: 2020-12-03
Attending: FAMILY MEDICINE
Payer: COMMERCIAL

## 2020-12-03 PROCEDURE — 97110 THERAPEUTIC EXERCISES: CPT | Mod: GP

## 2020-12-03 PROCEDURE — 97140 MANUAL THERAPY 1/> REGIONS: CPT | Mod: GP

## 2020-12-07 ENCOUNTER — HOSPITAL ENCOUNTER (OUTPATIENT)
Dept: PHYSICAL THERAPY | Facility: OTHER | Age: 40
Setting detail: THERAPIES SERIES
End: 2020-12-07
Attending: FAMILY MEDICINE
Payer: COMMERCIAL

## 2020-12-07 PROCEDURE — 97140 MANUAL THERAPY 1/> REGIONS: CPT | Mod: GP

## 2021-01-27 ENCOUNTER — OFFICE VISIT (OUTPATIENT)
Dept: OBGYN | Facility: OTHER | Age: 41
End: 2021-01-27
Attending: OBSTETRICS & GYNECOLOGY
Payer: COMMERCIAL

## 2021-01-27 VITALS
BODY MASS INDEX: 24.61 KG/M2 | HEART RATE: 68 BPM | WEIGHT: 174 LBS | SYSTOLIC BLOOD PRESSURE: 108 MMHG | DIASTOLIC BLOOD PRESSURE: 60 MMHG

## 2021-01-27 DIAGNOSIS — N89.8 VAGINAL DISCHARGE: ICD-10-CM

## 2021-01-27 DIAGNOSIS — N92.0 MENORRHAGIA WITH REGULAR CYCLE: Primary | ICD-10-CM

## 2021-01-27 LAB
HCG UR QL: NEGATIVE
SPECIMEN SOURCE: ABNORMAL
WET PREP SPEC: ABNORMAL

## 2021-01-27 PROCEDURE — 58300 INSERT INTRAUTERINE DEVICE: CPT | Performed by: OBSTETRICS & GYNECOLOGY

## 2021-01-27 PROCEDURE — 250N000011 HC RX IP 250 OP 636: Performed by: OBSTETRICS & GYNECOLOGY

## 2021-01-27 PROCEDURE — 99215 OFFICE O/P EST HI 40 MIN: CPT | Mod: 25 | Performed by: OBSTETRICS & GYNECOLOGY

## 2021-01-27 PROCEDURE — 87210 SMEAR WET MOUNT SALINE/INK: CPT | Mod: ZL | Performed by: OBSTETRICS & GYNECOLOGY

## 2021-01-27 PROCEDURE — 81025 URINE PREGNANCY TEST: CPT | Mod: ZL | Performed by: OBSTETRICS & GYNECOLOGY

## 2021-01-27 PROCEDURE — 88305 TISSUE EXAM BY PATHOLOGIST: CPT

## 2021-01-27 PROCEDURE — 58100 BIOPSY OF UTERUS LINING: CPT | Mod: XU | Performed by: OBSTETRICS & GYNECOLOGY

## 2021-01-27 RX ORDER — FLUCONAZOLE 150 MG/1
150 TABLET ORAL ONCE
Qty: 1 TABLET | Refills: 0 | Status: SHIPPED | OUTPATIENT
Start: 2021-01-27 | End: 2021-01-27

## 2021-01-27 RX ADMIN — LEVONORGESTREL 20 MCG: 52 INTRAUTERINE DEVICE INTRAUTERINE at 10:37

## 2021-01-27 ASSESSMENT — PAIN SCALES - GENERAL: PAINLEVEL: NO PAIN (0)

## 2021-01-27 NOTE — NURSING NOTE
Chief Complaint   Patient presents with     RECHECK     MENORRHAGIA      Follow up OCP's   Medication Reconciliation: completed   Nicolasa Zacarias LPN  1/27/2021 9:07 AM

## 2021-01-27 NOTE — PROGRESS NOTES
Follow-Up Visit    S: Ms. Coni Root is a 40 year old  here for heavy menses follow up. She started OCPs 3 months ago. She reports her bleeding is somewhat better- still lasts 7 days. Flow is heavy but no longer soaking through pads overnight and only 1 day is heavy. Cramping also improved. However, she is experiencing a lot of side effects. She is no longer feeling angry but is a lot more emotional, tearful and feels like this is getting worse. She has a sour stomach feeling all the time. She has breast tenderness and swollen labia. Her discharge has been unchanged. Overall, she doesn't think the improvement in bleeding is worth the side effects of continuing on OCPs.    O:  /60 (BP Location: Right arm, Patient Position: Sitting, Cuff Size: Adult Large)   Pulse 68   Wt 78.9 kg (174 lb)   LMP 2021 (Exact Date)   Breastfeeding No   BMI 24.61 kg/m    Gen: Well-appearing, NAD  Pulm: nonlabored  Psych: appropriate mood and affect    Pelvic:  Normal appearing external female genitalia. Normal hair distribution. Vagina is without lesions. Large white, chunky discharge. Cervix normal, no lesions, no cervical motion tenderness. Uterus is small, mobile, non-tender, anteverted. No adnexal tenderness or masses    Wet prep collected    IUD Insertion Procedure Visit    Verification of Procedure:  Just before the procedure begans through verbal and active participation of team members, I verified:     Initials   Patient Name Coni Root    Patient  1980    Procedure to be performed Endometrial biopsy, IUD insertion     Consent:  Risks, benefits of treatment, and alternative options for contraception were discussed.  Patient's questions were elicited and answered.  Written consent was obtained and scanned into medical record.     PROCEDURE NOTE  --  Mirena Insertion, Endometrial biopsy    Reason for Insertion:  abnormal uterine bleeding.    Pregnancy test: Negative    Counseling:   Patient counseled on efficacy, benefits, risks, potential side effects of IUD.  Insertion procedure and risk of infection, perforation, and spontaneous expulsion reviewed.   Advised to plan removal and/or replacement of IUD in 5 years from today or when desired.         Under sterile technique, cervix was visualized with a medium Graves speculum and prepped with Betadine solution. The uterus was sounded to 9 cm. The endometrial pipelle was easily passed through the cervix and a sample collected. The Mirena IUD insertion apparatus was prepared and placed through the cervix without significant resistance and deployed at the fundus in the usual fashion. The strings were trimmed 3 cm from the external os.      Device Lot #: [id number]  Device Expiration Date: May 2023     EBL:  Minimal     Complications:  None      Coni Root tolerated procedure well.      A/P:  Ms. Cnoi Root is a 40 year old  here for heavy menses management. Although patient does not have risk factors for hyperplasia or malignancy, she has not responded as expected to medical management and therefore endometrial biopsy was recommended and completed today. Discussed alternatives to OCPs given her side effects, including Depo, Mirena, ablation and hysterectomy. Reviewed expected bleeding patterns for each method and the potential impact on her other hormonal symptoms. After complete counseling, she desired IUD placement. Will have her follow up in 1 month for IUD and symptom check.    45 minutes were spent with the patient with additional 5 min performing above procedure    Alessandra Luevano MD  OB/GYN  2021 11:09 AM

## 2021-02-11 NOTE — PROGRESS NOTES
Outpatient Physical Therapy Discharge Note     Patient: Coni Root  : 1980    Beginning/End Dates of Reporting Period:  20 to 2021 (7 visits)    Referring Provider: Dr. Sherman    Therapy Diagnosis: chronic neck pain     Client Self Report 20: Margaret had been doing really good until over weekend slipped and fell on wood stairs, hit her R upper arm. She does has been taking it easy last couple days. She feels neck didn't get melody and she knows what to do to continue with neck HEP, so still comfortable to put PT on hold today.  Happy she has full ROM back, no problems with driving.  And biggest improvement with not getting neck pain at night affecting her sleep.     PT note: had chart on hold, haven't heard from pt so discharge from PT.     Objective Measurements:  Objective Measure: neck pain L side  Details: 10 at most  Objective Measure: ROM  Details: WFL       Goals:  Goal Identifier driving   Goal Description Pt increase cervical rotation to 50 deg or better B to improve safety with driving in 8 weeks.    Target Date 21   Date Met  20   Progress:     Goal Identifier sleep   Goal Description Pt decrease pain with laying through night to 4/10 or less in 8 weeks.   Target Date 21   Date Met  20   Progress:     Goal Identifier HEP   Goal Description Pt independent with further progression of HEP to manage any further sx at 8 weeks.    Target Date 21   Date Met  20   Progress:       Progress Toward Goals:   Progress this reporting period: met          Plan:  Discharge from therapy.    Discharge:    Reason for Discharge: Patient has met all goals.    Equipment Issued: na    Discharge Plan: Patient to continue home program.

## 2021-02-24 ENCOUNTER — OFFICE VISIT (OUTPATIENT)
Dept: OBGYN | Facility: OTHER | Age: 41
End: 2021-02-24
Attending: OBSTETRICS & GYNECOLOGY
Payer: COMMERCIAL

## 2021-02-24 VITALS
DIASTOLIC BLOOD PRESSURE: 60 MMHG | WEIGHT: 172 LBS | BODY MASS INDEX: 24.33 KG/M2 | HEART RATE: 60 BPM | SYSTOLIC BLOOD PRESSURE: 110 MMHG

## 2021-02-24 DIAGNOSIS — Z97.5 IUD (INTRAUTERINE DEVICE) IN PLACE: Primary | ICD-10-CM

## 2021-02-24 PROCEDURE — 99213 OFFICE O/P EST LOW 20 MIN: CPT | Performed by: OBSTETRICS & GYNECOLOGY

## 2021-02-24 ASSESSMENT — PAIN SCALES - GENERAL: PAINLEVEL: NO PAIN (0)

## 2021-02-24 NOTE — NURSING NOTE
Chief Complaint   Patient presents with     IUD     follow up      Mood is also better with stopping OCPs   Nicolasa Zacarias LPN........................2/24/2021  9:22 AM     Medication Reconciliation: completed   Nicolasa Zacarias LPN  2/24/2021 9:21 AM

## 2021-02-24 NOTE — PROGRESS NOTES
Follow-Up Visit    S: Ms. Coni Root is a 40 year old  here for IUD check. She had a Mirena placed on 21 for heavy menstrual bleeding. She reports her mood is back to normal. Still having some spotting but getting lighter. Believes she had one period during this time and it was very manageable. Overall happy with the IUD.     O:  /60 (BP Location: Right arm, Patient Position: Sitting, Cuff Size: Adult Regular)   Pulse 60   Wt 78 kg (172 lb)   Breastfeeding No   BMI 24.33 kg/m    Gen: Well-appearing, NAD  Pulm: nonlabored  Psych: appropriate mood and affect    Pelvic:  Normal appearing external female genitalia. Normal hair distribution. Vagina is without lesions. Small brown discharge. Cervix normal, IUD strings appear appropriate length    A/P:  Ms. Coni Root is a 40 year old  here for IUD check. Discussed expected bleeding patterns.   - RTC 1 year for IUD check or sooner andreea Luevano MD  OB/GYN  2021 9:19 AM

## 2021-03-29 ENCOUNTER — OFFICE VISIT (OUTPATIENT)
Dept: FAMILY MEDICINE | Facility: OTHER | Age: 41
End: 2021-03-29
Attending: FAMILY MEDICINE
Payer: COMMERCIAL

## 2021-03-29 VITALS
HEIGHT: 71 IN | TEMPERATURE: 98.3 F | DIASTOLIC BLOOD PRESSURE: 70 MMHG | WEIGHT: 173.38 LBS | BODY MASS INDEX: 24.27 KG/M2 | HEART RATE: 54 BPM | OXYGEN SATURATION: 100 % | RESPIRATION RATE: 18 BRPM | SYSTOLIC BLOOD PRESSURE: 116 MMHG

## 2021-03-29 DIAGNOSIS — F32.A DEPRESSION, UNSPECIFIED DEPRESSION TYPE: Primary | ICD-10-CM

## 2021-03-29 PROCEDURE — 99213 OFFICE O/P EST LOW 20 MIN: CPT | Performed by: FAMILY MEDICINE

## 2021-03-29 RX ORDER — FLUOXETINE 10 MG/1
10 CAPSULE ORAL DAILY
Qty: 7 CAPSULE | Refills: 0 | Status: SHIPPED | OUTPATIENT
Start: 2021-03-29 | End: 2021-04-30

## 2021-03-29 ASSESSMENT — ANXIETY QUESTIONNAIRES
7. FEELING AFRAID AS IF SOMETHING AWFUL MIGHT HAPPEN: NOT AT ALL
2. NOT BEING ABLE TO STOP OR CONTROL WORRYING: NOT AT ALL
IF YOU CHECKED OFF ANY PROBLEMS ON THIS QUESTIONNAIRE, HOW DIFFICULT HAVE THESE PROBLEMS MADE IT FOR YOU TO DO YOUR WORK, TAKE CARE OF THINGS AT HOME, OR GET ALONG WITH OTHER PEOPLE: SOMEWHAT DIFFICULT
6. BECOMING EASILY ANNOYED OR IRRITABLE: NEARLY EVERY DAY
GAD7 TOTAL SCORE: 4
3. WORRYING TOO MUCH ABOUT DIFFERENT THINGS: NOT AT ALL
1. FEELING NERVOUS, ANXIOUS, OR ON EDGE: SEVERAL DAYS
5. BEING SO RESTLESS THAT IT IS HARD TO SIT STILL: NOT AT ALL

## 2021-03-29 ASSESSMENT — ENCOUNTER SYMPTOMS
COUGH: 0
CHILLS: 0
SHORTNESS OF BREATH: 0
FEVER: 0
NERVOUS/ANXIOUS: 0

## 2021-03-29 ASSESSMENT — MIFFLIN-ST. JEOR: SCORE: 1539.61

## 2021-03-29 ASSESSMENT — PATIENT HEALTH QUESTIONNAIRE - PHQ9
5. POOR APPETITE OR OVEREATING: NOT AT ALL
SUM OF ALL RESPONSES TO PHQ QUESTIONS 1-9: 15

## 2021-03-29 ASSESSMENT — PAIN SCALES - GENERAL: PAINLEVEL: NO PAIN (0)

## 2021-03-29 NOTE — NURSING NOTE
Patient presents to clinic for establish care appointment with Tawny Sherman MD.  She has been experiencing increased stress and depression.  Medication Reconciliation: complete    Marielena Streeter LPN

## 2021-03-29 NOTE — PROGRESS NOTES
SUBJECTIVE:   Nursing Notes:   Marielena Streeter LPN  3/29/2021 10:32 AM  Sign at exiting of workspace  Patient presents to clinic for establish care appointment with Tawny Sherman MD.  She has been experiencing increased stress and depression.  Medication Reconciliation: complete    Marielena Streeter LPN        Coni Root is a 40 year old female who presents to clinic today for stress and depression.  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  Due to covid, has needed to be more isolated.  Her  broke his leg in a snowmobile accident.  Her mom has been also staying with them after an injury as well.  She had tried an oral contraceptives due to period issues several months ago, which made her feel more emotional, breast tenderness and made her feel pregnant.  She now has an IUD in, which is working better.  She states that she is not feeling like herself.  She is snapping at people more, which is not typical for her and she doesn't like this.  She doesn't feel like being around people like she usually would.    HPI    I personally reviewed medications/allergies/history listed below:    Patient Active Problem List    Diagnosis Date Noted     Eczema, unspecified type 2017     Priority: Medium     History reviewed. No pertinent past medical history.   Past Surgical History:   Procedure Laterality Date     NO HISTORY OF SURGERY       Family History   Problem Relation Age of Onset     Colon Cancer Father 73        Cancer-colon, age 73     Family History Negative Brother         Good Health     Social History     Tobacco Use     Smoking status: Never Smoker     Smokeless tobacco: Never Used   Substance Use Topics     Alcohol use: Yes     Comment: Occ.     Social History     Social History Narrative    , 2 kids (boy age 13,  "daughter age 11 as of 10/2020).  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     Current Outpatient Medications   Medication Sig Dispense Refill     cyclobenzaprine (FLEXERIL) 10 MG tablet Take 1 tablet (10 mg) by mouth nightly as needed for muscle spasms 30 tablet 11     ferrous sulfate (IRON) 325 (65 FE) MG tablet Take 325 mg by mouth 2 times daily as needed       FLUoxetine (PROZAC) 10 MG capsule Take 1 capsule (10 mg) by mouth daily for 7 days , then increase to 20 mg daily. 7 capsule 0     FLUoxetine (PROZAC) 20 MG capsule Take 1 capsule (20 mg) by mouth daily 90 capsule 3     levonorgestrel (MIRENA) 20 MCG/24HR IUD 1 each by Intrauterine route once Placed 1/27/21       Multiple Vitamins-Minerals (MULTIVITAMIN ADULTS PO)        triamcinolone (KENALOG) 0.1 % external cream Apply topically 3 times daily 30 g 3     Vitamin D, Cholecalciferol, 1000 units CAPS        No Known Allergies    Review of Systems   Constitutional: Negative for chills and fever.   Respiratory: Negative for cough and shortness of breath.    Cardiovascular: Negative for peripheral edema.   Psychiatric/Behavioral: Negative for mood changes. The patient is not nervous/anxious.         OBJECTIVE:     /70 (BP Location: Right arm, Patient Position: Sitting, Cuff Size: Adult Regular)   Pulse 54   Temp 98.3  F (36.8  C) (Tympanic)   Resp 18   Ht 1.791 m (5' 10.5\")   Wt 78.6 kg (173 lb 6 oz)   LMP  (LMP Unknown)   SpO2 100%   Breastfeeding No   BMI 24.53 kg/m    Body mass index is 24.53 kg/m .  Physical Exam  Constitutional:       Appearance: Normal appearance.   HENT:      Head: Normocephalic.   Eyes:      Extraocular Movements: Extraocular movements intact.      Pupils: Pupils are equal, round, and reactive to light.   Neurological:      Mental Status: She is alert.   Psychiatric:         Behavior: Behavior " normal.         Thought Content: Thought content normal.         Judgment: Judgment normal.      Comments: Tearful at times.         PHQ-2 Score:     PHQ-2 ( 1999 Pfizer) 3/29/2021 2/24/2021   Q1: Little interest or pleasure in doing things 3 0   Q2: Feeling down, depressed or hopeless 3 0   PHQ-2 Score 6 0         I personally reviewed results withpatient as listed below:   Diagnostic Test Results:  none     ASSESSMENT/PLAN:       ICD-10-CM    1. Depression, unspecified depression type  F32.9 FLUoxetine (PROZAC) 10 MG capsule     FLUoxetine (PROZAC) 20 MG capsule       1.  She is interested in starting a medication for this.  Started on fluoxetine 10 mg daily x 7 days, then increase to 20 mg daily.  Follow up in 1 month to ensure she is improving.  Offered referral for counseling and she is going to think about that and will get back to me if she decides she would like a referral.      Tawny Sherman MD  [facility] AND [facility]    Portions of this dictation were created using the Dragon Nuance voice recognition system. Proofreading was completed but there may be errors in text.

## 2021-03-30 ASSESSMENT — ANXIETY QUESTIONNAIRES: GAD7 TOTAL SCORE: 4

## 2021-04-30 ENCOUNTER — OFFICE VISIT (OUTPATIENT)
Dept: FAMILY MEDICINE | Facility: OTHER | Age: 41
End: 2021-04-30
Attending: FAMILY MEDICINE
Payer: COMMERCIAL

## 2021-04-30 VITALS
TEMPERATURE: 97.8 F | SYSTOLIC BLOOD PRESSURE: 102 MMHG | DIASTOLIC BLOOD PRESSURE: 60 MMHG | WEIGHT: 178 LBS | BODY MASS INDEX: 24.92 KG/M2 | RESPIRATION RATE: 20 BRPM | OXYGEN SATURATION: 100 % | HEART RATE: 60 BPM | HEIGHT: 71 IN

## 2021-04-30 DIAGNOSIS — F32.A DEPRESSION, UNSPECIFIED DEPRESSION TYPE: Primary | ICD-10-CM

## 2021-04-30 PROCEDURE — 99213 OFFICE O/P EST LOW 20 MIN: CPT | Performed by: FAMILY MEDICINE

## 2021-04-30 ASSESSMENT — ENCOUNTER SYMPTOMS
NERVOUS/ANXIOUS: 0
CHILLS: 0
COUGH: 0
SHORTNESS OF BREATH: 0
FEVER: 0

## 2021-04-30 ASSESSMENT — ANXIETY QUESTIONNAIRES
IF YOU CHECKED OFF ANY PROBLEMS ON THIS QUESTIONNAIRE, HOW DIFFICULT HAVE THESE PROBLEMS MADE IT FOR YOU TO DO YOUR WORK, TAKE CARE OF THINGS AT HOME, OR GET ALONG WITH OTHER PEOPLE: SOMEWHAT DIFFICULT
3. WORRYING TOO MUCH ABOUT DIFFERENT THINGS: NOT AT ALL
6. BECOMING EASILY ANNOYED OR IRRITABLE: NOT AT ALL
GAD7 TOTAL SCORE: 0
1. FEELING NERVOUS, ANXIOUS, OR ON EDGE: NOT AT ALL
2. NOT BEING ABLE TO STOP OR CONTROL WORRYING: NOT AT ALL
5. BEING SO RESTLESS THAT IT IS HARD TO SIT STILL: NOT AT ALL
7. FEELING AFRAID AS IF SOMETHING AWFUL MIGHT HAPPEN: NOT AT ALL

## 2021-04-30 ASSESSMENT — PAIN SCALES - GENERAL: PAINLEVEL: NO PAIN (0)

## 2021-04-30 ASSESSMENT — MIFFLIN-ST. JEOR: SCORE: 1560.59

## 2021-04-30 ASSESSMENT — PATIENT HEALTH QUESTIONNAIRE - PHQ9
SUM OF ALL RESPONSES TO PHQ QUESTIONS 1-9: 1
5. POOR APPETITE OR OVEREATING: NOT AT ALL

## 2021-04-30 NOTE — NURSING NOTE
"Chief Complaint   Patient presents with     Recheck Medication     depression follow up     Medication follow up.   Initial /60 (BP Location: Right arm, Patient Position: Sitting, Cuff Size: Adult Large)   Pulse 60   Temp 97.8  F (36.6  C) (Tympanic)   Resp 20   Ht 1.791 m (5' 10.5\")   Wt 80.7 kg (178 lb)   SpO2 100%   BMI 25.18 kg/m   Estimated body mass index is 25.18 kg/m  as calculated from the following:    Height as of this encounter: 1.791 m (5' 10.5\").    Weight as of this encounter: 80.7 kg (178 lb).  Medication Reconciliation: complete    Nika Edwards LPN  "

## 2021-04-30 NOTE — PROGRESS NOTES
"  SUBJECTIVE:   Nursing Notes:   Nika Edwards LPN  2021  9:16 AM  Sign at exiting of workspace  Chief Complaint   Patient presents with     Recheck Medication     depression follow up     Medication follow up.   Initial /60 (BP Location: Right arm, Patient Position: Sitting, Cuff Size: Adult Large)   Pulse 60   Temp 97.8  F (36.6  C) (Tympanic)   Resp 20   Ht 1.791 m (5' 10.5\")   Wt 80.7 kg (178 lb)   SpO2 100%   BMI 25.18 kg/m   Estimated body mass index is 25.18 kg/m  as calculated from the following:    Height as of this encounter: 1.791 m (5' 10.5\").    Weight as of this encounter: 80.7 kg (178 lb).  Medication Reconciliation: complete    Nika Edwards LPN      Coni Root is a 40 year old female who presents to clinic today for follow up of her depression.  I saw her last on 3/29/2021.  At that time, she started on fluoxetine.  She feels tremendously better than she had at her last visit.  She is still occasionally having a little difficulty falling asleep at night, which she attributes to stress.  She is taking melatonin when needed for this.  She is no longer waking up in the middle of the night and not being able to fall asleep again.  She has gained 6-7 lb since starting the fluoxetine according to her scale.    HPI    I personally reviewed medications/allergies/history listed below:    Patient Active Problem List    Diagnosis Date Noted     Eczema, unspecified type 2017     Priority: Medium     History reviewed. No pertinent past medical history.   Past Surgical History:   Procedure Laterality Date     NO HISTORY OF SURGERY       Family History   Problem Relation Age of Onset     Colon Cancer Father 73        Cancer-colon, age 73     Family History Negative Brother         Good Health     Social History     Tobacco Use     Smoking status: Never Smoker     Smokeless tobacco: Never Used   Substance Use Topics     Alcohol use: Yes     Comment: Occ.     Social " "History     Social History Narrative    , 2 kids (boy age 13, daughter age 11 as of 10/2020).  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     Current Outpatient Medications   Medication Sig Dispense Refill     cyclobenzaprine (FLEXERIL) 10 MG tablet Take 1 tablet (10 mg) by mouth nightly as needed for muscle spasms 30 tablet 11     ferrous sulfate (IRON) 325 (65 FE) MG tablet Take 325 mg by mouth 2 times daily as needed       FLUoxetine (PROZAC) 20 MG capsule Take 1 capsule (20 mg) by mouth daily 90 capsule 3     levonorgestrel (MIRENA) 20 MCG/24HR IUD 1 each by Intrauterine route once Placed 1/27/21       Multiple Vitamins-Minerals (MULTIVITAMIN ADULTS PO)        triamcinolone (KENALOG) 0.1 % external cream Apply topically 3 times daily 30 g 3     Vitamin D, Cholecalciferol, 1000 units CAPS        No Known Allergies    Review of Systems   Constitutional: Negative for chills and fever.   Respiratory: Negative for cough and shortness of breath.    Cardiovascular: Negative for peripheral edema.   Psychiatric/Behavioral: Positive for mood changes (improved). The patient is not nervous/anxious.         OBJECTIVE:     /60 (BP Location: Right arm, Patient Position: Sitting, Cuff Size: Adult Large)   Pulse 60   Temp 97.8  F (36.6  C) (Tympanic)   Resp 20   Ht 1.791 m (5' 10.5\")   Wt 80.7 kg (178 lb)   SpO2 100%   BMI 25.18 kg/m    Body mass index is 25.18 kg/m .  Physical Exam  Constitutional:       Appearance: Normal appearance.   HENT:      Head: Normocephalic.   Eyes:      Extraocular Movements: Extraocular movements intact.      Pupils: Pupils are equal, round, and reactive to light.   Neurological:      Mental Status: She is alert.   Psychiatric:         Mood and Affect: Mood normal.         Behavior: Behavior normal.         Thought Content: Thought content normal.    "      Judgment: Judgment normal.           PHQ-9 SCORE 3/29/2021 4/30/2021   PHQ-9 Total Score 15 1       PHQ-2 Score:     PHQ-2 ( 1999 Pfizer) 3/29/2021 2/24/2021   Q1: Little interest or pleasure in doing things 3 0   Q2: Feeling down, depressed or hopeless 3 0   PHQ-2 Score 6 0       ADRIANNE-7 SCORE 3/29/2021 4/30/2021   Total Score 4 0           I personally reviewed results withpatient as listed below:   Diagnostic Test Results:  none     ASSESSMENT/PLAN:       ICD-10-CM    1. Depression, unspecified depression type  F32.9        1.  Improved with Fluoxetine 20 mg daily.  She will continue to use melatonin intermittently if needed for sleep.  She will monitor her weight.  If continues to experience weight gain, could consider switching to Wellbutrin XL instead.    Tawny Sherman MD  [facility] AND [facility]

## 2021-05-01 ASSESSMENT — ANXIETY QUESTIONNAIRES: GAD7 TOTAL SCORE: 0

## 2021-10-09 ENCOUNTER — HEALTH MAINTENANCE LETTER (OUTPATIENT)
Age: 41
End: 2021-10-09

## 2021-11-04 DIAGNOSIS — L30.9 ECZEMA, UNSPECIFIED TYPE: ICD-10-CM

## 2021-11-05 ENCOUNTER — IMMUNIZATION (OUTPATIENT)
Dept: FAMILY MEDICINE | Facility: OTHER | Age: 41
End: 2021-11-05
Attending: FAMILY MEDICINE

## 2021-11-05 PROCEDURE — 91300 PR COVID VAC PFIZER DIL RECON 30 MCG/0.3 ML IM: CPT

## 2021-11-05 PROCEDURE — 0004A PR COVID VAC PFIZER DIL RECON 30 MCG/0.3 ML IM: CPT

## 2021-11-05 RX ORDER — TRIAMCINOLONE ACETONIDE 1 MG/G
CREAM TOPICAL
Qty: 30 G | Refills: 3 | Status: SHIPPED | OUTPATIENT
Start: 2021-11-05

## 2021-11-05 NOTE — TELEPHONE ENCOUNTER
"Requested Prescriptions   Pending Prescriptions Disp Refills     triamcinolone (KENALOG) 0.1 % external cream [Pharmacy Med Name: TRIAMCINOLONE 0.1% CREAM   30GM] 30 g 3     Sig: APPLY EXTERNALLY TO THE AFFECTED AREA THREE TIMES DAILY       Topical Steroids and Nonsteroidals Protocol Passed - 11/4/2021  5:58 PM        Passed - Patient is age 6 or older        Passed - Authorizing prescriber's most recent note related to this medication read.     If refill request is for ophthalmic use, please forward request to provider for approval.          Passed - High potency steroid not ordered        Passed - Recent (12 mo) or future (30 days) visit within the authorizing provider's specialty     Patient has had an office visit with the authorizing provider or a provider within the authorizing providers department within the previous 12 mos or has a future within next 30 days. See \"Patient Info\" tab in inbasket, or \"Choose Columns\" in Meds & Orders section of the refill encounter.              Passed - Medication is active on med list     Last Written Prescription Date:  10/19/20  Last Fill Quantity: 30 g,   # refills: 3  Last Office Visit: 4/30/21 Cody Zelaya  Future Office visit: none      Routing refill request to provider for review/approval because:  Unable to fill prescription refills per RN Medical Refill Policy.  Brenda J. Goodell, RN on 11/5/2021 at 11:32 AM      "

## 2021-12-04 ENCOUNTER — HEALTH MAINTENANCE LETTER (OUTPATIENT)
Age: 41
End: 2021-12-04

## 2022-01-16 ENCOUNTER — LAB REQUISITION (OUTPATIENT)
Dept: LAB | Facility: OTHER | Age: 42
End: 2022-01-16

## 2022-01-16 DIAGNOSIS — Z11.52 ENCOUNTER FOR SCREENING FOR COVID-19: ICD-10-CM

## 2022-01-16 LAB
FLUAV RNA SPEC QL NAA+PROBE: NEGATIVE
FLUBV RNA RESP QL NAA+PROBE: NEGATIVE
RSV RNA SPEC NAA+PROBE: NEGATIVE
SARS-COV-2 RNA RESP QL NAA+PROBE: POSITIVE

## 2022-01-16 PROCEDURE — 87637 SARSCOV2&INF A&B&RSV AMP PRB: CPT | Performed by: FAMILY MEDICINE

## 2022-03-07 ENCOUNTER — OFFICE VISIT (OUTPATIENT)
Dept: FAMILY MEDICINE | Facility: OTHER | Age: 42
End: 2022-03-07
Attending: PHYSICIAN ASSISTANT
Payer: COMMERCIAL

## 2022-03-07 ENCOUNTER — MYC MEDICAL ADVICE (OUTPATIENT)
Dept: FAMILY MEDICINE | Facility: OTHER | Age: 42
End: 2022-03-07
Payer: COMMERCIAL

## 2022-03-07 VITALS
DIASTOLIC BLOOD PRESSURE: 60 MMHG | WEIGHT: 187.7 LBS | BODY MASS INDEX: 26.87 KG/M2 | HEIGHT: 70 IN | OXYGEN SATURATION: 99 % | RESPIRATION RATE: 16 BRPM | HEART RATE: 66 BPM | SYSTOLIC BLOOD PRESSURE: 118 MMHG | TEMPERATURE: 98.6 F

## 2022-03-07 DIAGNOSIS — L03.115 CELLULITIS OF RIGHT ANTERIOR LOWER LEG: Primary | ICD-10-CM

## 2022-03-07 PROCEDURE — 99213 OFFICE O/P EST LOW 20 MIN: CPT | Performed by: NURSE PRACTITIONER

## 2022-03-07 RX ORDER — CEPHALEXIN 500 MG/1
500 CAPSULE ORAL 3 TIMES DAILY
Qty: 30 CAPSULE | Refills: 0 | Status: SHIPPED | OUTPATIENT
Start: 2022-03-07 | End: 2022-03-17

## 2022-03-07 ASSESSMENT — PATIENT HEALTH QUESTIONNAIRE - PHQ9: SUM OF ALL RESPONSES TO PHQ QUESTIONS 1-9: 1

## 2022-03-07 ASSESSMENT — PAIN SCALES - GENERAL: PAINLEVEL: MODERATE PAIN (4)

## 2022-03-07 NOTE — PROGRESS NOTES
ASSESSMENT/PLAN:     I have reviewed the nursing notes.  I have reviewed the findings, diagnosis, plan and need for follow up with the patient.      1. Cellulitis of right anterior lower leg    - cephALEXin (KEFLEX) 500 MG capsule; Take 1 capsule (500 mg) by mouth 3 times daily for 10 days  Dispense: 30 capsule; Refill: 0    Continue Hydrocortisone or Triamcinolone TID PRN for pruritis and inflammation    May use over-the-counter Tylenol or ibuprofen PRN    Cool compresses PRN  Avoid heat, rubbing, friction, and scratching    Discussed warning signs/symptoms indicative of need to f/u  Follow up if symptoms persist or worsen or concerns      I explained my diagnostic considerations and recommendations to the patient, who voiced understanding and agreement with the treatment plan. All questions were answered. We discussed potential side effects of any prescribed or recommended therapies, as well as expectations for response to treatments.    Marine Gonzalez NP  [facility] AND [facility]      SUBJECTIVE:   Coni Root is a 41 year old female who presents to clinic today for the following health issues:  Rash    HPI  Hx of eczema flares around August, normally resolves within a month.  Current flare has been since August (7 months) and not resolving, and worsening the past week.  Started itching the past week, scratched it and now infected.  Area has increased in size.  Now itching and tender.  No noted drainage.  States bleeding from site last week.  No fevers or chills.    Using Triamcinolone without relief  Using OTC Hydrocortisone 1% with relief initially      No past medical history on file.  Past Surgical History:   Procedure Laterality Date     NO HISTORY OF SURGERY       Social History     Tobacco Use     Smoking status: Never Smoker     Smokeless tobacco: Never Used   Substance Use Topics     Alcohol use: Yes     Comment: Occ.     Current Outpatient Medications   Medication Sig Dispense Refill  "    cyclobenzaprine (FLEXERIL) 10 MG tablet Take 1 tablet (10 mg) by mouth nightly as needed for muscle spasms 30 tablet 11     ferrous sulfate (IRON) 325 (65 FE) MG tablet Take 325 mg by mouth 2 times daily as needed       FLUoxetine (PROZAC) 20 MG capsule Take 1 capsule (20 mg) by mouth daily 90 capsule 3     levonorgestrel (MIRENA) 20 MCG/24HR IUD 1 each by Intrauterine route once Placed 1/27/21       Multiple Vitamins-Minerals (MULTIVITAMIN ADULTS PO)        triamcinolone (KENALOG) 0.1 % external cream APPLY EXTERNALLY TO THE AFFECTED AREA THREE TIMES DAILY 30 g 3     Vitamin D, Cholecalciferol, 1000 units CAPS  (Patient not taking: Reported on 3/7/2022)       No Known Allergies      Past medical history, past surgical history, current medications and allergies reviewed and accurate to the best of my knowledge.        OBJECTIVE:     /60   Pulse 66   Temp 98.6  F (37  C) (Temporal)   Resp 16   Ht 1.785 m (5' 10.28\")   Wt 85.1 kg (187 lb 11.2 oz)   SpO2 99%   Breastfeeding No   BMI 26.72 kg/m    Body mass index is 26.72 kg/m .     Physical Exam  General Appearance: Well appearing adult female, non ill appearance, appropriate appearance for age. No acute distress  Respiratory: normal chest wall and respirations.  Normal effort.  No cough appreciated.  Cardiac: CMS intact to right lower extremity, no edema   Musculoskeletal:  Equal movement of bilateral upper extremities.  Equal movement of bilateral lower extremities.  Normal gait.    Dermatological: right mid shin with deep erythematous/purplish shiny plaque measuring 11 cm x 5 cm, area is not warm to touch, no fluctuance or induration, no noted vesicles or pustules, no active drainage or bleeding.  Area is tender to palpation.  See attached photo.  Psychological: normal affect, alert, oriented, and pleasant.         "

## 2022-03-07 NOTE — NURSING NOTE
"Chief Complaint   Patient presents with     Derm Problem     eczema right leg       Right lower leg  Started in August   Worse in the last week.     Initial /60   Pulse 66   Temp 98.6  F (37  C) (Temporal)   Resp 16   Ht 1.785 m (5' 10.28\")   Wt 85.1 kg (187 lb 11.2 oz)   SpO2 99%   Breastfeeding No   BMI 26.72 kg/m   Estimated body mass index is 26.72 kg/m  as calculated from the following:    Height as of this encounter: 1.785 m (5' 10.28\").    Weight as of this encounter: 85.1 kg (187 lb 11.2 oz).         [place] Edwards, ABNER   "

## 2022-04-15 DIAGNOSIS — F32.A DEPRESSION, UNSPECIFIED DEPRESSION TYPE: ICD-10-CM

## 2022-04-18 NOTE — TELEPHONE ENCOUNTER
[facility] sent Rx request for the following:      fluoxetine 20 mg capsule      Last Prescription Date:   3/29/2021  Last Fill Qty/Refills:         90, R-3    Last Office Visit:              4/30/2021   Future Office visit:           none    Ricardo Galindo RN, BSN  ....................  4/18/2022   9:15 AM

## 2022-04-25 ENCOUNTER — TELEPHONE (OUTPATIENT)
Dept: FAMILY MEDICINE | Facility: OTHER | Age: 42
End: 2022-04-25

## 2022-04-25 ENCOUNTER — E-VISIT (OUTPATIENT)
Dept: FAMILY MEDICINE | Facility: OTHER | Age: 42
End: 2022-04-25
Attending: FAMILY MEDICINE
Payer: COMMERCIAL

## 2022-04-25 DIAGNOSIS — R21 RASH: Primary | ICD-10-CM

## 2022-04-25 NOTE — TELEPHONE ENCOUNTER
Please call patient - she submitted an e-visit, but difficult to determine what is going on through that document.  I would like to see her.  I have a same day appointment tomorrow available at 1:40 Tuesday 4/26/22 if that would work for her.  Tawny Sherman MD

## 2022-04-25 NOTE — TELEPHONE ENCOUNTER
Called and verified patient full name and . Notified patient of below. Patient added to schedule tomorrow.     May Awad LPN on 2022 at 4:36 PM

## 2022-04-26 ENCOUNTER — OFFICE VISIT (OUTPATIENT)
Dept: FAMILY MEDICINE | Facility: OTHER | Age: 42
End: 2022-04-26
Attending: FAMILY MEDICINE
Payer: COMMERCIAL

## 2022-04-26 VITALS
DIASTOLIC BLOOD PRESSURE: 80 MMHG | OXYGEN SATURATION: 98 % | WEIGHT: 187 LBS | BODY MASS INDEX: 26.62 KG/M2 | RESPIRATION RATE: 16 BRPM | SYSTOLIC BLOOD PRESSURE: 124 MMHG | HEART RATE: 76 BPM

## 2022-04-26 DIAGNOSIS — F32.A DEPRESSION, UNSPECIFIED DEPRESSION TYPE: ICD-10-CM

## 2022-04-26 DIAGNOSIS — R21 RASH: Primary | ICD-10-CM

## 2022-04-26 PROCEDURE — 99213 OFFICE O/P EST LOW 20 MIN: CPT | Performed by: FAMILY MEDICINE

## 2022-04-26 RX ORDER — CLOBETASOL PROPIONATE 0.5 MG/G
CREAM TOPICAL 2 TIMES DAILY
Qty: 45 G | Refills: 0 | Status: SHIPPED | OUTPATIENT
Start: 2022-04-26 | End: 2022-05-10

## 2022-04-26 ASSESSMENT — PAIN SCALES - GENERAL: PAINLEVEL: MILD PAIN (2)

## 2022-04-26 NOTE — NURSING NOTE
Chief Complaint   Patient presents with     Derm Problem     Here today to Follow-up from EVISIT yesterday.     Medication Reconciliation: complete    May Awad LPN

## 2022-04-26 NOTE — PROGRESS NOTES
Nursing Notes:   Ernestina Awadhel ABNER JUNIOR  4/26/2022  1:50 PM  Signed  Chief Complaint   Patient presents with     Derm Problem     Here today to Follow-up from [facility] yesterday.     Medication Reconciliation: complete    May Awad LPN      Subjective   Margaret is a 41 year old who presents for the following health issues     She almost always has a flare of eczema in August and September.  This year, her flare has persisted since August.  A couple of weeks ago it got much worse.  She had cellulitis and was put on antibiotics for 10 days.  It improved her symptoms, but the rash has not resolved.  She has been using triamcinolone for years and it has not been helping in the recent months.  No drainage.  No fever.  Very itchy.  Feels hot.  No other rash elsewhere.  Usually the eczema affects her ankle and her left ear every year.  It has not affected her shin before.    History of Present Illness       Reason for visit:  Excema flair  Symptom onset:  More than a month  Symptoms include:  Excema  Symptom intensity:  Severe  Symptom progression:  Worsening  Had these symptoms before:  Yes  Has tried/received treatment for these symptoms:  Yes  Previous treatment was successful:  No  What makes it worse:  Na  What makes it better:  Ice    She eats 0-1 servings of fruits and vegetables daily.She consumes 2 sweetened beverage(s) daily.She exercises with enough effort to increase her heart rate 9 or less minutes per day.  She exercises with enough effort to increase her heart rate 3 or less days per week.   She is taking medications regularly.       Derm concerns       Review of Systems   Constitutional, HEENT, cardiovascular, pulmonary, GI, , musculoskeletal, neuro, skin, endocrine and psych systems are negative, except as otherwise noted.      Objective    /80 (BP Location: Right arm, Patient Position: Sitting, Cuff Size: Adult Regular)   Pulse 76   Resp 16   Wt 84.8 kg (187 lb)   LMP  (LMP Unknown)   SpO2 98%    Breastfeeding No   BMI 26.62 kg/m    Body mass index is 26.62 kg/m .  Physical Exam  HENT:      Head: Normocephalic.   Eyes:      Pupils: Pupils are equal, round, and reactive to light.   Skin:     Comments: On her left anterior shin, there is a fairly sharply circumscribed oval area of purple-red discoloration.  It is minimally raised and plaque-like compared to neighboring skin.  It is shiny in appearance.  There is no associated scale.  The coloration of the area is fairly uniform.  There are no areas of ulceration.  There is no underlying induration.  No surrounding redness.  No drainage.     Psychiatric:         Mood and Affect: Mood normal.         Behavior: Behavior normal.          Assessment & Plan       ICD-10-CM    1. Rash  R21 clobetasol (TEMOVATE) 0.05 % external cream     Adult Dermatology Referral   2. Depression, unspecified depression type  F32.A FLUoxetine (PROZAC) 20 MG capsule     1.  Her area of rash may be post-inflammatory hyperpigmentation, but appears to have more active inflammation associated with it given her itching.  Differential diagnosis would include eczema, psoriasis, among others.  Will have her try a more potent steroid of clobetasol 0.05% cream twice daily for 14 days.  If this is not helping, she should stop and would recommend Dermatology consult.  Referral is placed.  2.  Fluoxetine refilled.  This is working well for her at this time.      Tawny Sherman MD  [facility]

## 2022-05-11 ENCOUNTER — TELEPHONE (OUTPATIENT)
Dept: FAMILY MEDICINE | Facility: OTHER | Age: 42
End: 2022-05-11
Payer: COMMERCIAL

## 2022-05-11 DIAGNOSIS — Z12.31 VISIT FOR SCREENING MAMMOGRAM: Primary | ICD-10-CM

## 2022-05-11 NOTE — TELEPHONE ENCOUNTER
Would like to do mammogram at same time of PX, please place order and contact pt so she can schedule, thank you!      Julianne Ramon on 5/11/2022 at 3:49 PM

## 2022-05-13 ENCOUNTER — MYC REFILL (OUTPATIENT)
Dept: FAMILY MEDICINE | Facility: OTHER | Age: 42
End: 2022-05-13
Payer: COMMERCIAL

## 2022-05-13 DIAGNOSIS — R21 RASH: ICD-10-CM

## 2022-05-13 RX ORDER — CLOBETASOL PROPIONATE 0.5 MG/G
CREAM TOPICAL 2 TIMES DAILY
Qty: 45 G | Refills: 0 | Status: CANCELLED | OUTPATIENT
Start: 2022-05-13

## 2022-06-07 ENCOUNTER — HOSPITAL ENCOUNTER (OUTPATIENT)
Dept: MAMMOGRAPHY | Facility: OTHER | Age: 42
Discharge: HOME | End: 2022-06-07
Attending: FAMILY MEDICINE
Payer: COMMERCIAL

## 2022-06-07 ENCOUNTER — OFFICE VISIT (OUTPATIENT)
Dept: FAMILY MEDICINE | Facility: OTHER | Age: 42
End: 2022-06-07
Attending: FAMILY MEDICINE
Payer: COMMERCIAL

## 2022-06-07 VITALS
BODY MASS INDEX: 27.4 KG/M2 | OXYGEN SATURATION: 97 % | DIASTOLIC BLOOD PRESSURE: 74 MMHG | HEART RATE: 67 BPM | SYSTOLIC BLOOD PRESSURE: 122 MMHG | RESPIRATION RATE: 16 BRPM | WEIGHT: 191.4 LBS | HEIGHT: 70 IN | TEMPERATURE: 97.7 F

## 2022-06-07 DIAGNOSIS — D22.9 MULTIPLE ATYPICAL SKIN MOLES: ICD-10-CM

## 2022-06-07 DIAGNOSIS — Z00.00 HEALTH CARE MAINTENANCE: Primary | ICD-10-CM

## 2022-06-07 DIAGNOSIS — Z80.0 FAMILY HISTORY OF COLON CANCER: ICD-10-CM

## 2022-06-07 DIAGNOSIS — Z12.31 VISIT FOR SCREENING MAMMOGRAM: ICD-10-CM

## 2022-06-07 DIAGNOSIS — L30.9 ECZEMA, UNSPECIFIED TYPE: ICD-10-CM

## 2022-06-07 PROCEDURE — 99396 PREV VISIT EST AGE 40-64: CPT | Performed by: FAMILY MEDICINE

## 2022-06-07 PROCEDURE — 77067 SCR MAMMO BI INCL CAD: CPT

## 2022-06-07 ASSESSMENT — ENCOUNTER SYMPTOMS
FREQUENCY: 0
PALPITATIONS: 0
ABDOMINAL PAIN: 0
MYALGIAS: 0
EYE PAIN: 0
COUGH: 0
HEADACHES: 0
HEARTBURN: 0
NAUSEA: 0
CONSTIPATION: 0
HEMATURIA: 0
SHORTNESS OF BREATH: 0
CHILLS: 0
ARTHRALGIAS: 0
DYSURIA: 0
HEMATOCHEZIA: 0
JOINT SWELLING: 0
WEAKNESS: 0
DIZZINESS: 0
PARESTHESIAS: 0
NERVOUS/ANXIOUS: 0
SORE THROAT: 0
FEVER: 0
DIARRHEA: 0

## 2022-06-07 ASSESSMENT — PAIN SCALES - GENERAL: PAINLEVEL: NO PAIN (0)

## 2022-06-07 NOTE — NURSING NOTE
Chief Complaint   Patient presents with     Physical     Here today for Px. Has some derm concerns.     Medication Reconciliation: complete    May Awad, LPN

## 2022-06-07 NOTE — PROGRESS NOTES
SUBJECTIVE:   CC: Coni Root is an 41 year old woman who presents for preventive health visit.     Has some moles that are kind of itchy and would like to have them looked at.      Her dad had  of colon cancer at age 72.  He was diagnosed at age 71.      Has had a chronic rash on her right shin.  She had eczema on her shin.  She used triamcinolone for a couple of weeks.    Patient has been advised of split billing requirements and indicates understanding: Yes  Healthy Habits:     Getting at least 3 servings of Calcium per day:  NO    Bi-annual eye exam:  Yes    Dental care twice a year:  Yes    Sleep apnea or symptoms of sleep apnea:  None    Diet:  Regular (no restrictions)    Frequency of exercise:  None    Taking medications regularly:  Yes    Medication side effects:  Not applicable    PHQ-2 Total Score: 0    Additional concerns today:  Yes          Today's PHQ-2 Score:   PHQ-2 (  [employer]) 2022   Q1: Little interest or pleasure in doing things 0   Q2: Feeling down, depressed or hopeless 0   PHQ-2 Score 0   PHQ-2 Total Score (12-17 Years)- Positive if 3 or more points; Administer PHQ-A if positive -   Q1: Little interest or pleasure in doing things Not at all   Q2: Feeling down, depressed or hopeless Not at all   PHQ-2 Score 0       Abuse: Current or Past (Physical, Sexual or Emotional) - No  Do you feel safe in your environment? Yes    Have you ever done Advance Care Planning? (For example, a Health Directive, POLST, or a discussion with a medical provider or your loved ones about your wishes): No, advance care planning information given to patient to review.  Patient declined advance care planning discussion at this time.    Social History     Tobacco Use     Smoking status: Never Smoker     Smokeless tobacco: Never Used   Substance Use Topics     Alcohol use: Yes     Comment: Occ.     If you drink alcohol do you typically have >3 drinks per day or >7 drinks per week? No    Alcohol Use  2022   Prescreen: >3 drinks/day or >7 drinks/week? No   Prescreen: >3 drinks/day or >7 drinks/week? -       Reviewed orders with patient.  Reviewed health maintenance and updated orders accordingly - Yes  BP Readings from Last 3 Encounters:   22 122/74   22 124/80   22 118/60    Wt Readings from Last 3 Encounters:   22 86.8 kg (191 lb 6.4 oz)   22 84.8 kg (187 lb)   22 85.1 kg (187 lb 11.2 oz)                  Patient Active Problem List   Diagnosis     Eczema, unspecified type     Past Surgical History:   Procedure Laterality Date     NO HISTORY OF SURGERY         Social History     Tobacco Use     Smoking status: Never Smoker     Smokeless tobacco: Never Used   Substance Use Topics     Alcohol use: Yes     Comment: Occ.     Family History   Problem Relation Age of Onset     Osteoporosis Mother      Colon Cancer Father 71        Cancer-colon, age 72     Family History Negative Brother         Good Health         Current Outpatient Medications   Medication Sig Dispense Refill     cyclobenzaprine (FLEXERIL) 10 MG tablet Take 1 tablet (10 mg) by mouth nightly as needed for muscle spasms 30 tablet 11     ferrous sulfate (IRON) 325 (65 FE) MG tablet Take 325 mg by mouth 2 times daily as needed       FLUoxetine (PROZAC) 20 MG capsule Take 1 capsule (20 mg) by mouth daily 90 capsule 3     levonorgestrel (MIRENA) 20 MCG/24HR IUD 1 each by Intrauterine route once Placed 21       Multiple Vitamins-Minerals (MULTIVITAMIN ADULTS PO)        triamcinolone (KENALOG) 0.1 % external cream APPLY EXTERNALLY TO THE AFFECTED AREA THREE TIMES DAILY 30 g 3     Vitamin D, Cholecalciferol, 1000 units CAPS        No Known Allergies    Breast Cancer Screening:  Any new diagnosis of family breast, ovarian, or bowel cancer? Yes       FHS-7:   Breast CA Risk Assessment (FHS-7) 2022   Did any of your first-degree relatives have breast or ovarian cancer? No   Did any of your relatives have  "bilateral breast cancer? No   Did any man in your family have breast cancer? No   Did any woman in your family have breast and ovarian cancer? No   Did any woman in your family have breast cancer before age 50 y? No   Do you have 2 or more relatives with breast and/or ovarian cancer? No   Do you have 2 or more relatives with breast and/or bowel cancer? No         Pertinent mammograms are reviewed under the imaging tab.    History of abnormal Pap smear:   PAP / HPV Latest Ref Rng & Units 10/19/2020   PAP (Historical) - NIL   HPV16 NEG:Negative Negative   HPV18 NEG:Negative Negative   HRHPV NEG:Negative Negative     Reviewed and updated as needed this visit by clinical staff   Tobacco  Allergies  Meds                Reviewed and updated as needed this visit by Provider                   No past medical history on file.   Past Surgical History:   Procedure Laterality Date     NO HISTORY OF SURGERY         Review of Systems  CONSTITUTIONAL: NEGATIVE for fever, chills, change in weight  INTEGUMENTARY/SKIN: POSITIVE for moles and eczema.  EYES: NEGATIVE for vision changes or irritation  ENT: NEGATIVE for ear, mouth and throat problems  RESP: NEGATIVE for significant cough or SOB  BREAST: NEGATIVE for masses, tenderness or discharge  CV: NEGATIVE for chest pain, palpitations or peripheral edema  GI: NEGATIVE for nausea, abdominal pain, heartburn, or change in bowel habits  : NEGATIVE for unusual urinary or vaginal symptoms. Periods are regular.  MUSCULOSKELETAL: NEGATIVE for significant arthralgias or myalgia  NEURO: NEGATIVE for weakness, dizziness or paresthesias  PSYCHIATRIC: NEGATIVE for changes in mood or affect     OBJECTIVE:   /74 (BP Location: Right arm, Patient Position: Sitting, Cuff Size: Adult Regular)   Pulse 67   Temp 97.7  F (36.5  C) (Tympanic)   Resp 16   Ht 1.784 m (5' 10.25\")   Wt 86.8 kg (191 lb 6.4 oz)   LMP  (LMP Unknown)   SpO2 97%   Breastfeeding No   BMI 27.27 kg/m    Physical " Exam  GENERAL: healthy, alert and no distress  EYES: Eyes grossly normal to inspection, PERRL and conjunctivae and sclerae normal  HENT: ear canals and TM's normal, nose and mouth without ulcers or lesions  NECK: no adenopathy, no asymmetry, masses, or scars and thyroid normal to palpation  RESP: lungs clear to auscultation - no rales, rhonchi or wheezes  BREAST: normal without masses, tenderness or nipple discharge and no palpable axillary masses or adenopathy  CV: regular rate and rhythm, normal S1 S2, no S3 or S4, no murmur, click or rub, no peripheral edema and peripheral pulses strong  ABDOMEN: soft, nontender, no hepatosplenomegaly, no masses and bowel sounds normal   (female): deferred  MS: no gross musculoskeletal defects noted, no edema  SKIN: on her right shin, there is a brawny hyperpigmentation in a large oval.  There is no associated scale currently.  On her back and chest/abdomen, there are multiple dark brown nevi.  Some are up to 4-5 mm in diameter and some have irregular borders.  NEURO: Normal strength and tone, mentation intact and speech normal  PSYCH: mentation appears normal, affect normal/bright        ASSESSMENT/PLAN:     Assessment & Plan       ICD-10-CM    1. Health care maintenance  Z00.00    2. Multiple atypical skin moles  D22.9 Adult Dermatology Referral   3. Eczema, unspecified type  L30.9 Adult Dermatology Referral   4. Family history of colon cancer  Z80.0      1.  Pap Smear/HPV are up to date, last completed 10/19/2020.  Mammogram was completed today.  tdap ut, last completed 10/19/2020.  covid x 3 up to date.  Flu shot up to date.  Declines labs.  2.  With numerous moles that appear atypical, recommended seeing Dermatology to determine which should be removed.  3.  This looks less inflamed currently, but has a lot of post-inflammatory changes.  Referred to Dermatology for this as well.  4.  Recommended that she start colon cancer screening at age 44 with her family  "history.        COUNSELING:  Reviewed preventive health counseling, as reflected in patient instructions       Regular exercise       Healthy diet/nutrition       Vision screening       Colorectal Cancer Screening    Estimated body mass index is 27.27 kg/m  as calculated from the following:    Height as of this encounter: 1.784 m (5' 10.25\").    Weight as of this encounter: 86.8 kg (191 lb 6.4 oz).    Weight management plan: Discussed healthy diet and exercise guidelines    She reports that she has never smoked. She has never used smokeless tobacco.      Counseling Resources:  ATP IV Guidelines  Pooled Cohorts Equation Calculator  Breast Cancer Risk Calculator  BRCA-Related Cancer Risk Assessment: FHS-7 Tool  FRAX Risk Assessment  ICSI Preventive Guidelines  Dietary Guidelines for Americans, 2010  USDA's MyPlate  ASA Prophylaxis  Lung CA Screening    Tawny Sherman MD  [facility] AND [facility]  "

## 2022-06-15 ENCOUNTER — MYC MEDICAL ADVICE (OUTPATIENT)
Dept: FAMILY MEDICINE | Facility: OTHER | Age: 42
End: 2022-06-15
Payer: COMMERCIAL

## 2022-09-17 ENCOUNTER — HEALTH MAINTENANCE LETTER (OUTPATIENT)
Age: 42
End: 2022-09-17

## 2022-10-06 ENCOUNTER — MYC MEDICAL ADVICE (OUTPATIENT)
Dept: FAMILY MEDICINE | Facility: OTHER | Age: 42
End: 2022-10-06
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
## 2022-10-06 RX ORDER — DOXYCYCLINE HYCLATE 100 MG
200 TABLET ORAL ONCE
Qty: 2 TABLET | Refills: 0 | Status: SHIPPED | OUTPATIENT
Start: 2022-10-06 | End: 2022-10-06

## 2022-12-06 ENCOUNTER — ALLIED HEALTH/NURSE VISIT (OUTPATIENT)
Dept: FAMILY MEDICINE | Facility: OTHER | Age: 42
End: 2022-12-06
Attending: FAMILY MEDICINE
Payer: COMMERCIAL

## 2022-12-06 DIAGNOSIS — Z23 NEED FOR PROPHYLACTIC VACCINATION AND INOCULATION AGAINST INFLUENZA: Primary | ICD-10-CM

## 2022-12-06 PROCEDURE — 90686 IIV4 VACC NO PRSV 0.5 ML IM: CPT

## 2022-12-06 PROCEDURE — 90471 IMMUNIZATION ADMIN: CPT

## 2023-06-09 DIAGNOSIS — F32.A DEPRESSION, UNSPECIFIED DEPRESSION TYPE: ICD-10-CM

## 2023-06-12 ENCOUNTER — MYC MEDICAL ADVICE (OUTPATIENT)
Dept: FAMILY MEDICINE | Facility: OTHER | Age: 43
End: 2023-06-12
Payer: COMMERCIAL

## 2023-06-12 DIAGNOSIS — F32.A DEPRESSION, UNSPECIFIED DEPRESSION TYPE: ICD-10-CM

## 2023-06-12 NOTE — TELEPHONE ENCOUNTER
Requested Prescriptions   Pending Prescriptions Disp Refills     FLUoxetine (PROZAC) 20 MG capsule 90 capsule 3     Sig: Take 1 capsule (20 mg) by mouth daily       There is no refill protocol information for this order          Last Prescription Date:   4/26/2022  Last Fill Qty/Refills:         90, R-3    Last Office Visit:              6/7/2022   Future Office visit:           None    Asked to schedule annual physical via [employer].    Deana Rose RN on 6/12/2023 at 12:40 PM

## 2023-06-14 NOTE — TELEPHONE ENCOUNTER
[facility] sent Rx request for the following:      Requested Prescriptions   Pending Prescriptions Disp Refills     FLUoxetine (PROZAC) 20 MG capsule [Pharmacy Med Name: fluoxetine 20 mg capsule] 90 capsule 3     Sig: Take 1 capsule (20 mg) by mouth daily     Approved 6/12/23. Pharmacy notified. Marielena Rose RN .............. 6/14/2023  3:46 PM

## 2023-07-29 ENCOUNTER — HEALTH MAINTENANCE LETTER (OUTPATIENT)
Age: 43
End: 2023-07-29

## 2023-07-29 ASSESSMENT — ENCOUNTER SYMPTOMS
MYALGIAS: 0
NAUSEA: 0
EYE PAIN: 0
JOINT SWELLING: 0
CONSTIPATION: 0
ARTHRALGIAS: 1
ABDOMINAL PAIN: 0
HEADACHES: 0
HEMATOCHEZIA: 1
DYSURIA: 0
NERVOUS/ANXIOUS: 0
SHORTNESS OF BREATH: 0
BREAST MASS: 0
PARESTHESIAS: 0
FREQUENCY: 0
PALPITATIONS: 0
WEAKNESS: 0
SORE THROAT: 0
HEARTBURN: 0
COUGH: 0
CHILLS: 0
DIZZINESS: 0
FEVER: 0
HEMATURIA: 0
DIARRHEA: 1

## 2023-08-04 ENCOUNTER — OFFICE VISIT (OUTPATIENT)
Dept: FAMILY MEDICINE | Facility: OTHER | Age: 43
End: 2023-08-04
Attending: FAMILY MEDICINE
Payer: COMMERCIAL

## 2023-08-04 VITALS
DIASTOLIC BLOOD PRESSURE: 74 MMHG | RESPIRATION RATE: 16 BRPM | WEIGHT: 192.8 LBS | TEMPERATURE: 98.2 F | BODY MASS INDEX: 27.47 KG/M2 | SYSTOLIC BLOOD PRESSURE: 130 MMHG | HEART RATE: 74 BPM

## 2023-08-04 DIAGNOSIS — R19.5 CHANGE IN STOOL: ICD-10-CM

## 2023-08-04 DIAGNOSIS — F32.A DEPRESSION, UNSPECIFIED DEPRESSION TYPE: ICD-10-CM

## 2023-08-04 DIAGNOSIS — Z00.00 HEALTH CARE MAINTENANCE: Primary | ICD-10-CM

## 2023-08-04 DIAGNOSIS — Z13.0 SCREENING FOR DEFICIENCY ANEMIA: ICD-10-CM

## 2023-08-04 DIAGNOSIS — R23.2 HOT FLASHES: ICD-10-CM

## 2023-08-04 DIAGNOSIS — Z13.29 SCREENING FOR THYROID DISORDER: ICD-10-CM

## 2023-08-04 DIAGNOSIS — Z13.1 SCREENING FOR DIABETES MELLITUS: ICD-10-CM

## 2023-08-04 DIAGNOSIS — Z13.220 SCREENING FOR LIPID DISORDERS: ICD-10-CM

## 2023-08-04 LAB
ALBUMIN SERPL BCG-MCNC: 4.5 G/DL (ref 3.5–5.2)
ALP SERPL-CCNC: 56 U/L (ref 35–104)
ALT SERPL W P-5'-P-CCNC: 15 U/L (ref 0–50)
ANION GAP SERPL CALCULATED.3IONS-SCNC: 11 MMOL/L (ref 7–15)
AST SERPL W P-5'-P-CCNC: 25 U/L (ref 0–45)
BASOPHILS # BLD AUTO: 0 10E3/UL (ref 0–0.2)
BASOPHILS NFR BLD AUTO: 1 %
BILIRUB SERPL-MCNC: 0.4 MG/DL
BUN SERPL-MCNC: 11 MG/DL (ref 6–20)
CALCIUM SERPL-MCNC: 9.2 MG/DL (ref 8.6–10)
CHLORIDE SERPL-SCNC: 102 MMOL/L (ref 98–107)
CHOLEST SERPL-MCNC: 191 MG/DL
CREAT SERPL-MCNC: 1.08 MG/DL (ref 0.51–0.95)
DEPRECATED HCO3 PLAS-SCNC: 23 MMOL/L (ref 22–29)
EOSINOPHIL # BLD AUTO: 0 10E3/UL (ref 0–0.7)
EOSINOPHIL NFR BLD AUTO: 1 %
ERYTHROCYTE [DISTWIDTH] IN BLOOD BY AUTOMATED COUNT: 11.4 % (ref 10–15)
GFR SERPL CREATININE-BSD FRML MDRD: 65 ML/MIN/1.73M2
GLUCOSE SERPL-MCNC: 89 MG/DL (ref 70–99)
HCT VFR BLD AUTO: 41.5 % (ref 35–47)
HDLC SERPL-MCNC: 46 MG/DL
HGB BLD-MCNC: 14.4 G/DL (ref 11.7–15.7)
IMM GRANULOCYTES # BLD: 0 10E3/UL
IMM GRANULOCYTES NFR BLD: 0 %
LDLC SERPL CALC-MCNC: 132 MG/DL
LYMPHOCYTES # BLD AUTO: 1.2 10E3/UL (ref 0.8–5.3)
LYMPHOCYTES NFR BLD AUTO: 20 %
MCH RBC QN AUTO: 33.4 PG (ref 26.5–33)
MCHC RBC AUTO-ENTMCNC: 34.7 G/DL (ref 31.5–36.5)
MCV RBC AUTO: 96 FL (ref 78–100)
MONOCYTES # BLD AUTO: 0.4 10E3/UL (ref 0–1.3)
MONOCYTES NFR BLD AUTO: 7 %
NEUTROPHILS # BLD AUTO: 4.3 10E3/UL (ref 1.6–8.3)
NEUTROPHILS NFR BLD AUTO: 71 %
NONHDLC SERPL-MCNC: 145 MG/DL
NRBC # BLD AUTO: 0 10E3/UL
NRBC BLD AUTO-RTO: 0 /100
PLATELET # BLD AUTO: 289 10E3/UL (ref 150–450)
POTASSIUM SERPL-SCNC: 4.2 MMOL/L (ref 3.4–5.3)
PROT SERPL-MCNC: 7.1 G/DL (ref 6.4–8.3)
RBC # BLD AUTO: 4.31 10E6/UL (ref 3.8–5.2)
SODIUM SERPL-SCNC: 136 MMOL/L (ref 136–145)
TRIGL SERPL-MCNC: 63 MG/DL
TSH SERPL DL<=0.005 MIU/L-ACNC: 2.4 UIU/ML (ref 0.3–4.2)
WBC # BLD AUTO: 6 10E3/UL (ref 4–11)

## 2023-08-04 PROCEDURE — 80053 COMPREHEN METABOLIC PANEL: CPT | Mod: ZL | Performed by: FAMILY MEDICINE

## 2023-08-04 PROCEDURE — 99396 PREV VISIT EST AGE 40-64: CPT | Performed by: FAMILY MEDICINE

## 2023-08-04 PROCEDURE — 85025 COMPLETE CBC W/AUTO DIFF WBC: CPT | Mod: ZL | Performed by: FAMILY MEDICINE

## 2023-08-04 PROCEDURE — 36415 COLL VENOUS BLD VENIPUNCTURE: CPT | Mod: ZL | Performed by: FAMILY MEDICINE

## 2023-08-04 PROCEDURE — 80061 LIPID PANEL: CPT | Mod: ZL | Performed by: FAMILY MEDICINE

## 2023-08-04 PROCEDURE — 84443 ASSAY THYROID STIM HORMONE: CPT | Mod: ZL | Performed by: FAMILY MEDICINE

## 2023-08-04 ASSESSMENT — ENCOUNTER SYMPTOMS
BREAST MASS: 0
MYALGIAS: 0
FREQUENCY: 0
HEMATOCHEZIA: 1
COUGH: 0
WEAKNESS: 0
DYSURIA: 0
DIZZINESS: 0
ARTHRALGIAS: 1
HEARTBURN: 0
CHILLS: 0
HEADACHES: 0
FEVER: 0
EYE PAIN: 0
ABDOMINAL PAIN: 0
HEMATURIA: 0
NAUSEA: 0
NERVOUS/ANXIOUS: 0
PARESTHESIAS: 0
SORE THROAT: 0
CONSTIPATION: 0
DIARRHEA: 1
SHORTNESS OF BREATH: 0
PALPITATIONS: 0
JOINT SWELLING: 0

## 2023-08-04 ASSESSMENT — PATIENT HEALTH QUESTIONNAIRE - PHQ9
10. IF YOU CHECKED OFF ANY PROBLEMS, HOW DIFFICULT HAVE THESE PROBLEMS MADE IT FOR YOU TO DO YOUR WORK, TAKE CARE OF THINGS AT HOME, OR GET ALONG WITH OTHER PEOPLE: NOT DIFFICULT AT ALL
SUM OF ALL RESPONSES TO PHQ QUESTIONS 1-9: 3
SUM OF ALL RESPONSES TO PHQ QUESTIONS 1-9: 3

## 2023-08-04 ASSESSMENT — PAIN SCALES - GENERAL: PAINLEVEL: NO PAIN (0)

## 2023-08-04 NOTE — PROGRESS NOTES
Nursing Notes:   May Awad LPN  8/4/2023  2:54 PM  Signed  Chief Complaint   Patient presents with    Physical         Medication Reconciliation: complete    May Awad LPN       SUBJECTIVE:   CC: Margaret is an 43 year old who presents for preventive health visit.       8/4/2023     2:53 PM   Additional Questions   Roomed by May Awad       Has started having some issues with starting hot flashes already.  She has a mirena IUD.      She doesn't normally have diarrhea, but has been having loose stools at least 3 days a week.  The other days it is normal.  Seems random and unrelated to what she has eaten.  Sometimes might notice blood in her stool at times.  She had a hemorrhoid develop with her 16 year old's pregnancy and it never went away.  No constipation.  Her father has a history of colon cancer diagnosed in his early 70s.    Healthy Habits:     Getting at least 3 servings of Calcium per day:  NO    Bi-annual eye exam:  Yes    Dental care twice a year:  Yes    Sleep apnea or symptoms of sleep apnea:  None    Diet:  Regular (no restrictions)    Frequency of exercise:  None    Taking medications regularly:  Yes    Medication side effects:  None    Additional concerns today:  Yes      Today's PHQ-9 Score:       8/4/2023     2:34 PM   PHQ-9 SCORE   PHQ-9 Total Score MyChart 3 (Minimal depression)   PHQ-9 Total Score 3                   Social History     Tobacco Use    Smoking status: Never    Smokeless tobacco: Never   Substance Use Topics    Alcohol use: Yes     Comment: Occ.             7/29/2023     7:53 PM   Alcohol Use   Prescreen: >3 drinks/day or >7 drinks/week? No     Reviewed orders with patient.  Reviewed health maintenance and updated orders accordingly - Yes  BP Readings from Last 3 Encounters:   08/04/23 130/74   06/07/22 122/74   04/26/22 124/80    Wt Readings from Last 3 Encounters:   08/04/23 87.5 kg (192 lb 12.8 oz)   06/07/22 86.8 kg (191 lb 6.4 oz)   04/26/22 84.8 kg (187 lb)                   Patient Active Problem List   Diagnosis    Eczema, unspecified type     Past Surgical History:   Procedure Laterality Date    NO HISTORY OF SURGERY         Social History     Tobacco Use    Smoking status: Never    Smokeless tobacco: Never   Substance Use Topics    Alcohol use: Yes     Comment: Occ.     Family History   Problem Relation Age of Onset    Osteoporosis Mother     Colon Cancer Father 71        Cancer-colon, age 72    Family History Negative Brother         Good Health         Current Outpatient Medications   Medication Sig Dispense Refill    FLUoxetine (PROZAC) 20 MG capsule Take 1 capsule (20 mg) by mouth daily 90 capsule 3    levonorgestrel (MIRENA) 20 MCG/24HR IUD 1 each by Intrauterine route once Placed 21      triamcinolone (KENALOG) 0.1 % external cream APPLY EXTERNALLY TO THE AFFECTED AREA THREE TIMES DAILY 30 g 3     No Known Allergies  Recent Labs   Lab Test 23  1534 19  0713   * 86   HDL 46* 52   TRIG 63 49   ALT 15  --    CR 1.08*  --    GFRESTIMATED 65  --    POTASSIUM 4.2  --    TSH 2.40  --         Breast Cancer Screening:  Any new diagnosis of family breast, ovarian, or bowel cancer? No    FHS-7:       2022     3:57 PM   Breast CA Risk Assessment (FHS-7)   Did any of your first-degree relatives have breast or ovarian cancer? No   Did any of your relatives have bilateral breast cancer? No   Did any man in your family have breast cancer? No   Did any woman in your family have breast and ovarian cancer? No   Did any woman in your family have breast cancer before age 50 y? No   Do you have 2 or more relatives with breast and/or ovarian cancer? No   Do you have 2 or more relatives with breast and/or bowel cancer? No       Mammogram Screening - Offered annual screening and updated Health Maintenance for mutual plan based on risk factor consideration  Pertinent mammograms are reviewed under the imaging tab.    History of abnormal Pap smear: NO - age 30-65  PAP every 5 years with negative HPV co-testing recommended      Latest Ref Rng & Units 10/19/2020     3:19 PM   PAP / HPV   PAP (Historical)  NIL    HPV 16 DNA NEG^Negative Negative    HPV 18 DNA NEG^Negative Negative    Other HR HPV NEG^Negative Negative      Reviewed and updated as needed this visit by clinical staff   Tobacco  Allergies  Meds              Reviewed and updated as needed this visit by Provider                 No past medical history on file.   Past Surgical History:   Procedure Laterality Date    NO HISTORY OF SURGERY         Review of Systems   Constitutional:  Negative for chills and fever.   HENT:  Negative for congestion, ear pain, hearing loss and sore throat.    Eyes:  Negative for pain and visual disturbance.   Respiratory:  Negative for cough and shortness of breath.    Cardiovascular:  Negative for chest pain, palpitations and peripheral edema.   Gastrointestinal:  Positive for diarrhea and hematochezia. Negative for abdominal pain, constipation, heartburn and nausea.   Breasts:  Negative for tenderness, breast mass and discharge.   Genitourinary:  Positive for vaginal discharge. Negative for dysuria, frequency, genital sores, hematuria, pelvic pain, urgency and vaginal bleeding.   Musculoskeletal:  Positive for arthralgias. Negative for joint swelling and myalgias.   Skin:  Negative for rash.   Neurological:  Negative for dizziness, weakness, headaches and paresthesias.   Psychiatric/Behavioral:  Negative for mood changes. The patient is not nervous/anxious.           OBJECTIVE:   /74   Pulse 74   Temp 98.2  F (36.8  C) (Tympanic)   Resp 16   Wt 87.5 kg (192 lb 12.8 oz)   LMP 08/04/2023   Breastfeeding No   BMI 27.47 kg/m    Physical Exam  Constitutional:       General: She is not in acute distress.     Appearance: She is well-developed.   HENT:      Head: Normocephalic.      Right Ear: Tympanic membrane and external ear normal.      Left Ear: Tympanic membrane and  external ear normal.      Nose: Nose normal.      Mouth/Throat:      Pharynx: No oropharyngeal exudate.   Eyes:      General:         Right eye: No discharge.         Left eye: No discharge.      Conjunctiva/sclera: Conjunctivae normal.      Pupils: Pupils are equal, round, and reactive to light.   Neck:      Thyroid: No thyromegaly.      Trachea: No tracheal deviation.   Cardiovascular:      Rate and Rhythm: Normal rate and regular rhythm.      Pulses: Normal pulses.      Heart sounds: Normal heart sounds, S1 normal and S2 normal. No murmur heard.     No friction rub. No gallop. No S3 or S4 sounds.   Pulmonary:      Effort: Pulmonary effort is normal. No respiratory distress.      Breath sounds: Normal breath sounds. No wheezing or rales.      Comments: Breast exam:  No masses palpable bilaterally.  No skin changes, tethering or axillary lymphadenopathy bilaterally.    Abdominal:      General: Bowel sounds are normal. There is no distension.      Palpations: Abdomen is soft. There is no mass.      Tenderness: There is no abdominal tenderness.   Genitourinary:     Comments: Pelvic/Rectal exams deferred per patient.  Musculoskeletal:         General: Normal range of motion.      Cervical back: Neck supple.   Lymphadenopathy:      Cervical: No cervical adenopathy.   Skin:     General: Skin is warm and dry.      Findings: No rash.   Neurological:      Mental Status: She is alert and oriented to person, place, and time.      Motor: No abnormal muscle tone.      Deep Tendon Reflexes: Reflexes are normal and symmetric.   Psychiatric:         Thought Content: Thought content normal.         Judgment: Judgment normal.               ASSESSMENT/PLAN:       ICD-10-CM    1. Health care maintenance  Z00.00       2. Screening for thyroid disorder  Z13.29 TSH Reflex GH     TSH Reflex GH      3. Screening for deficiency anemia  Z13.0 CBC with Platelets & Differential     CBC with Platelets & Differential      4. Screening for  diabetes mellitus  Z13.1 Comprehensive metabolic panel     Comprehensive metabolic panel      5. Screening for lipid disorders  Z13.220 Lipid Profile     Lipid Profile     CANCELED: Lipid Profile      6. Change in stool  R19.5 Adult GI  Referral - Procedure Only      7. Depression, unspecified depression type  F32.A FLUoxetine (PROZAC) 20 MG capsule      8. Hot flashes  R23.2         1.  Pap/HPV are up-to-date, last completed 10/19/2020 and were normal at that time.  Mammogram discussed.  Her last was done 2022.  She wishes to defer a mammogram this year and consider repeating it next year.  Tdap is up-to-date, last completed 10/19/2020.  COVID and flu vaccines were discussed, but not given today.  She declines hepatitis B vaccination series as well as HIV and hepatitis C screening due to low risk.  2.  Labs as above.  3.  Labs as above.  4.  Lab as above.  5.  Labs as above.  6.  Given her change in bowel habits and family history of colon cancer, she is referred for colonoscopy.  7.  Stable.  Refills as above.  8.  May be experiencing some early perimenopausal changes.  She does have a Mirena IUD, which is helped tremendously with her menorrhagia symptoms.  Patient has been advised of split billing requirements and indicates understanding: Yes      COUNSELING:  Reviewed preventive health counseling, as reflected in patient instructions       Regular exercise       Healthy diet/nutrition       Vision screening       Colorectal Cancer Screening       Consider Hep C screening for all patients one time for ages 18-79 years       HIV screeninx in teen years, 1x in adult years, and at intervals if high risk       (Madisyn)menopause management        She reports that she has never smoked. She has never used smokeless tobacco.          Tawny Sherman MD  [facility] AND [facility]

## 2023-08-04 NOTE — NURSING NOTE
Chief Complaint   Patient presents with    Physical         Medication Reconciliation: complete    May Awad, LPN

## 2023-08-05 DIAGNOSIS — R79.89 ELEVATED SERUM CREATININE: Primary | ICD-10-CM

## 2023-08-08 ENCOUNTER — TELEPHONE (OUTPATIENT)
Dept: SURGERY | Facility: OTHER | Age: 43
End: 2023-08-08
Payer: COMMERCIAL

## 2023-08-08 NOTE — TELEPHONE ENCOUNTER
[facility] Diagnostic Referral    Patient has a referral for a c-scope with a diagnosis of Change in stool .    Please advise.    Thank you,  Shikha Bess on 8/8/2023 at 10:39 AM

## 2023-08-10 DIAGNOSIS — Z12.11 ENCOUNTER FOR SCREENING COLONOSCOPY: Primary | ICD-10-CM

## 2023-08-10 NOTE — TELEPHONE ENCOUNTER
Screening Questions for the Scheduling of Screening Colonoscopies   (If Colonoscopy is diagnostic, Provider should review the chart before scheduling.)  Are you younger than 50 or older than 80?  NO  Do you take aspirin or fish oil?  NO (if yes, tell patient to stop 1 week prior to Colonoscopy)  Do you take warfarin (Coumadin), clopidogrel (Plavix), apixaban (Eliquis), dabigatram (Pradaxa), rivaroxaban (Xarelto) or any blood thinner? NO  Do you use oxygen at home?  NO  Do you have kidney disease? NO  Are you on dialysis? NO  Have you had a stroke or heart attack in the last year? NO  Have you had a stent in your heart or any blood vessel in the last year? NO  Have you had a transplant of any organ? NO  Have you had a colonoscopy or upper endoscopy (EGD) before? NO  Date of scheduled Colonoscopy. 09/06/2023  Provider [facility]  Pharmacy [facility]

## 2023-08-11 RX ORDER — POLYETHYLENE GLYCOL 3350, SODIUM CHLORIDE, SODIUM BICARBONATE, POTASSIUM CHLORIDE 420; 11.2; 5.72; 1.48 G/4L; G/4L; G/4L; G/4L
4000 POWDER, FOR SOLUTION ORAL ONCE
Qty: 4000 ML | Refills: 0 | Status: SHIPPED | OUTPATIENT
Start: 2023-08-30 | End: 2023-08-30

## 2023-08-11 RX ORDER — BISACODYL 5 MG/1
TABLET, DELAYED RELEASE ORAL
Qty: 2 TABLET | Refills: 0 | Status: ON HOLD | OUTPATIENT
Start: 2023-08-30 | End: 2023-09-06

## 2023-09-06 ENCOUNTER — ANESTHESIA (OUTPATIENT)
Dept: SURGERY | Facility: OTHER | Age: 43
End: 2023-09-06
Payer: COMMERCIAL

## 2023-09-06 ENCOUNTER — ANESTHESIA EVENT (OUTPATIENT)
Dept: SURGERY | Facility: OTHER | Age: 43
End: 2023-09-06
Payer: COMMERCIAL

## 2023-09-06 ENCOUNTER — HOSPITAL ENCOUNTER (OUTPATIENT)
Facility: OTHER | Age: 43
Discharge: HOME | End: 2023-09-06
Attending: SURGERY | Admitting: SURGERY
Payer: COMMERCIAL

## 2023-09-06 VITALS
HEART RATE: 71 BPM | HEIGHT: 70 IN | DIASTOLIC BLOOD PRESSURE: 67 MMHG | WEIGHT: 192 LBS | RESPIRATION RATE: 16 BRPM | BODY MASS INDEX: 27.49 KG/M2 | SYSTOLIC BLOOD PRESSURE: 110 MMHG | TEMPERATURE: 96.2 F | OXYGEN SATURATION: 99 %

## 2023-09-06 DIAGNOSIS — K64.4 ANAL SKIN TAG: ICD-10-CM

## 2023-09-06 DIAGNOSIS — K57.30 DIVERTICULOSIS OF COLON WITHOUT DIVERTICULITIS: Primary | ICD-10-CM

## 2023-09-06 PROCEDURE — 45378 DIAGNOSTIC COLONOSCOPY: CPT | Performed by: NURSE ANESTHETIST, CERTIFIED REGISTERED

## 2023-09-06 PROCEDURE — 45378 DIAGNOSTIC COLONOSCOPY: CPT | Performed by: SURGERY

## 2023-09-06 PROCEDURE — 250N000011 HC RX IP 250 OP 636: Performed by: NURSE ANESTHETIST, CERTIFIED REGISTERED

## 2023-09-06 PROCEDURE — 250N000009 HC RX 250: Performed by: NURSE ANESTHETIST, CERTIFIED REGISTERED

## 2023-09-06 RX ORDER — PROPOFOL 10 MG/ML
INJECTION, EMULSION INTRAVENOUS CONTINUOUS PRN
Status: DISCONTINUED | OUTPATIENT
Start: 2023-09-06 | End: 2023-09-06

## 2023-09-06 RX ORDER — PROPOFOL 10 MG/ML
INJECTION, EMULSION INTRAVENOUS PRN
Status: DISCONTINUED | OUTPATIENT
Start: 2023-09-06 | End: 2023-09-06

## 2023-09-06 RX ORDER — LIDOCAINE HYDROCHLORIDE 20 MG/ML
INJECTION, SOLUTION INFILTRATION; PERINEURAL PRN
Status: DISCONTINUED | OUTPATIENT
Start: 2023-09-06 | End: 2023-09-06

## 2023-09-06 RX ORDER — NALOXONE HYDROCHLORIDE 0.4 MG/ML
0.2 INJECTION, SOLUTION INTRAMUSCULAR; INTRAVENOUS; SUBCUTANEOUS
Status: DISCONTINUED | OUTPATIENT
Start: 2023-09-06 | End: 2023-09-06 | Stop reason: HOSPADM

## 2023-09-06 RX ORDER — SODIUM CHLORIDE, SODIUM LACTATE, POTASSIUM CHLORIDE, CALCIUM CHLORIDE 600; 310; 30; 20 MG/100ML; MG/100ML; MG/100ML; MG/100ML
INJECTION, SOLUTION INTRAVENOUS CONTINUOUS
Status: DISCONTINUED | OUTPATIENT
Start: 2023-09-06 | End: 2023-09-06 | Stop reason: HOSPADM

## 2023-09-06 RX ORDER — NALOXONE HYDROCHLORIDE 0.4 MG/ML
0.4 INJECTION, SOLUTION INTRAMUSCULAR; INTRAVENOUS; SUBCUTANEOUS
Status: DISCONTINUED | OUTPATIENT
Start: 2023-09-06 | End: 2023-09-06 | Stop reason: HOSPADM

## 2023-09-06 RX ORDER — FLUMAZENIL 0.1 MG/ML
0.2 INJECTION, SOLUTION INTRAVENOUS
Status: DISCONTINUED | OUTPATIENT
Start: 2023-09-06 | End: 2023-09-06 | Stop reason: HOSPADM

## 2023-09-06 RX ORDER — ONDANSETRON 4 MG/1
4 TABLET, ORALLY DISINTEGRATING ORAL EVERY 6 HOURS PRN
Status: DISCONTINUED | OUTPATIENT
Start: 2023-09-06 | End: 2023-09-06 | Stop reason: HOSPADM

## 2023-09-06 RX ORDER — LIDOCAINE 40 MG/G
CREAM TOPICAL
Status: DISCONTINUED | OUTPATIENT
Start: 2023-09-06 | End: 2023-09-06 | Stop reason: HOSPADM

## 2023-09-06 RX ORDER — ONDANSETRON 2 MG/ML
4 INJECTION INTRAMUSCULAR; INTRAVENOUS EVERY 6 HOURS PRN
Status: DISCONTINUED | OUTPATIENT
Start: 2023-09-06 | End: 2023-09-06 | Stop reason: HOSPADM

## 2023-09-06 RX ORDER — GLYCOPYRROLATE 0.2 MG/ML
INJECTION, SOLUTION INTRAMUSCULAR; INTRAVENOUS PRN
Status: DISCONTINUED | OUTPATIENT
Start: 2023-09-06 | End: 2023-09-06

## 2023-09-06 RX ORDER — ONDANSETRON 2 MG/ML
4 INJECTION INTRAMUSCULAR; INTRAVENOUS
Status: DISCONTINUED | OUTPATIENT
Start: 2023-09-06 | End: 2023-09-06 | Stop reason: HOSPADM

## 2023-09-06 RX ORDER — ATROPINE SULFATE 0.4 MG/ML
AMPUL (ML) INJECTION PRN
Status: DISCONTINUED | OUTPATIENT
Start: 2023-09-06 | End: 2023-09-06

## 2023-09-06 RX ORDER — PROCHLORPERAZINE MALEATE 5 MG
10 TABLET ORAL EVERY 6 HOURS PRN
Status: DISCONTINUED | OUTPATIENT
Start: 2023-09-06 | End: 2023-09-06 | Stop reason: HOSPADM

## 2023-09-06 RX ADMIN — PROPOFOL 30 MG: 10 INJECTION, EMULSION INTRAVENOUS at 08:01

## 2023-09-06 RX ADMIN — PROPOFOL 140 MCG/KG/MIN: 10 INJECTION, EMULSION INTRAVENOUS at 08:00

## 2023-09-06 RX ADMIN — PROPOFOL 80 MG: 10 INJECTION, EMULSION INTRAVENOUS at 08:00

## 2023-09-06 RX ADMIN — ATROPINE SULFATE 0.2 MG: 0.4 INJECTION, SOLUTION INTRAMUSCULAR; INTRAVENOUS; SUBCUTANEOUS at 08:11

## 2023-09-06 RX ADMIN — LIDOCAINE HYDROCHLORIDE 40 MG: 20 INJECTION, SOLUTION INFILTRATION; PERINEURAL at 08:00

## 2023-09-06 RX ADMIN — GLYCOPYRROLATE 0.2 MG: 0.2 INJECTION, SOLUTION INTRAMUSCULAR; INTRAVENOUS at 08:09

## 2023-09-06 ASSESSMENT — ACTIVITIES OF DAILY LIVING (ADL)
ADLS_ACUITY_SCORE: 35
ADLS_ACUITY_SCORE: 35

## 2023-09-06 NOTE — DISCHARGE INSTRUCTIONS
Procedure you had done: colonoscopy-no polyps  Your health care provider is:  Tawny Sherman  Your surgeon is Dr. Rosamaria Romero. Will discuss excision of anal skin tag.  Please call your health care provider or surgeon at [phone] if:    - you feel you are getting worse or having an increase in problems    - fever greater than 101 degrees  - increasing shortness of breath or chest pain  - any signs of infection (increasing redness, swelling, tenderness, warmth, change in appearance, or  increased drainage)  - blood in your urine or stool  - coughing or vomiting blood  - nausea (upset stomach) and vomiting and/or diarrhea that will not stop  - severe pain that is not relieved by medicine, rest or ice  You have had medications for sedation. Please be aware that this can cause drowsiness and impaired judgment for up to 24 hours after your procedure. Do not drive, operate power tools or drink alcohol for 24 hours.  If samples were taken-you will get a phone call and a letter with your results in the next 7-10 days. If you don't get results, please call and let us know!     [facility]  Adult Discharge Orders & Instructions      For 24 hours after surgery:  Get plenty of rest.  A responsible adult must stay with you for at least 24 hours after you leave the hospital.   You may feel lightheaded.  IF so, sit for a few minutes before standing.  Have someone help you get up.   You may have a slight fever. Call the doctor if your fever is over 101 F (38.3 C) (taken under the tongue) or lasts longer than 24 hours.  You may have a dry mouth, a sore throat, muscle aches or trouble sleeping.  These should go away after 24 hours.  Do not make important or legal decisions.  6.   Do not drive or use heavy equipment.  If you have weakness or tingling, don't drive or use heavy equipment until this feeling goes away.                                                                                                                                                                          To contact a doctor, call    [phone]______________

## 2023-09-06 NOTE — OP NOTE
PROCEDURE NOTE    SURGEON: Rosamaria Romero MD.    PRE-OP DIAGNOSIS:  Diagnostic Colonoscopy, change in bowel habits, rectal bleeding      POST-OP DIAGNOSIS: anal skin tag, diverticula of sigmoid colon    PROCEDURE:  diagnostic colonoscopy    SPECIMEN:  none    ANESTHESIA:  See anesthesia note    ESTIMATED BLOOD LOSS: none    COMPLICATIONS:  None    INDICATION FOR THE PROCEDURE: The patient is a 43 year old female. The patient presents with change in bowel habits and occasional rectal blood. I explained to the patient the risks, benefits and alternatives to diagnostic colonoscopy for evaluating her complaints. We specifically discussed the risks of bleeding, infection, perforation, potential inability to reach the cecum and the risks of sedation. The patient's questions were answered and the patient wished to proceed. Informed consent paperwork was completed.    PROCEDURE: The patient was taken to the endoscopy suite. Appropriate monitors were attached. The patient was placed in the left lateral decubitus position.Timeout was performed confirming the patient's identity and procedure to be performed. After appropriate sedation was confirmed, digital rectal exam was performed. There was normal tone, a pedunculated right sided anal skin tag was noted. The lubricated colonoscope was introduced into the anus the colon was insufflated with air. The prep quality was adequate. Under direct visualization the scope was advanced to the cecum. The ileocecal valve was intubated and the terminal ileum inspected. No gross abnormality was noted. The scope was withdrawn back into the cecum. The mucosa of the colon was inspected while withdrawing the scope. No abnormalities were noted other than scattered diverticula in the sigmoid colon. The scope was retroflexed in the rectum and the anorectal junction was inspected. No abnormalities were noted. The scope was returned to a neutral position and the colon was decompressed. The scope  was removed. The patient tolerated the procedure with no immediately apparent complication. The patient was taken to recovery in stable condition.    FOLLOW UP:RECOMMEND high fiber diet. 5 year screening colonoscopy due to family history. Discuss excision of anal skin tag as out patient surgery procedure with anesthesia care.

## 2023-09-06 NOTE — OR NURSING
Patient has been discharged to home at 0930 via ambulatory accompanied by Chica BAKER    Written discharge instructions were provided to patient.  Prescriptions were none.  Patient states their pain is none, and denies any nausea or dizziness upon discharge.    Patient and adult caring for them verbalize understanding of discharge instructions including no driving until tomorrow and no longer taking narcotic pain medications - no operating mechanical equipment and no making any important decisions.They understand reason for discharge, and necessary follow-up appointments.

## 2023-09-06 NOTE — ANESTHESIA CARE TRANSFER NOTE
Patient: Coni Root    Procedure: Procedure(s):  Colonoscopy       Diagnosis: Change in stool [R19.5]  Diagnosis Additional Information: No value filed.    Anesthesia Type:   MAC     Note:    Oropharynx: oropharynx clear of all foreign objects  Level of Consciousness: awake  Oxygen Supplementation: room air        Vital Signs Stable: post-procedure vital signs reviewed and stable  Report to RN Given: handoff report given  Patient transferred to: Phase II    Handoff Report: Identifed the Patient, Identified the Reponsible Provider, Reviewed the pertinent medical history, Discussed the surgical course, Reviewed Intra-OP anesthesia mangement and issues during anesthesia, Set expectations for post-procedure period and Allowed opportunity for questions and acknowledgement of understanding      Vitals:  Vitals Value Taken Time   BP     Temp     Pulse     Resp     SpO2         Electronically Signed By: ROSS MIRZA [facility]  September 6, 2023  8:28 AM

## 2023-09-06 NOTE — ANESTHESIA POSTPROCEDURE EVALUATION
Patient: Coni Root    Procedure: Procedure(s):  Colonoscopy       Anesthesia Type:  MAC    Note:  Disposition: Outpatient   Postop Pain Control: Uneventful            Sign Out: Well controlled pain   PONV: No   Neuro/Psych: Uneventful            Sign Out: Acceptable/Baseline neuro status   Airway/Respiratory: Uneventful            Sign Out: Acceptable/Baseline resp. status   CV/Hemodynamics: Uneventful            Sign Out: Acceptable CV status; No obvious hypovolemia; No obvious fluid overload   Other NRE: NONE   DID A NON-ROUTINE EVENT OCCUR?            Last vitals:  Vitals Value Taken Time   /67 09/06/23 0855   Temp     Pulse 68 09/06/23 0855   Resp 16 09/06/23 0832   SpO2 100 % 09/06/23 0856   Vitals shown include unvalidated device data.    Electronically Signed By: ROSS MIRZA CRNA  September 6, 2023  9:02 AM

## 2023-09-06 NOTE — H&P
"PRE-PROCEDURE NOTE    CHIEF COMPLAINT / REASON FORPROCEDURE:  change in bowel habits, family history of colon cancer    PERTINENT HISTORY   Patient with new diarrhea and occasional blood in stool. Previous colonoscopy none. No constipation, abdominal pain. Family history of colon cancer-father.  History reviewed. No pertinent past medical history.  Past Surgical History:   Procedure Laterality Date    NO HISTORY OF SURGERY       Other:  None  Bleeding tendencies:  No    Relevant Family History:  yes, father colon cancer    Relevant Social History:  none    A relevant review of systems was performed and was Negative.    ALLERGIES/SENSITIVITIES: No Known Allergies     CURRENTMEDICATIONS:    No current facility-administered medications on file prior to encounter.  FLUoxetine (PROZAC) 20 MG capsule, Take 1 capsule (20 mg) by mouth daily  levonorgestrel (MIRENA) 20 MCG/24HR IUD, 1 each by Intrauterine route once Placed 1/27/21  triamcinolone (KENALOG) 0.1 % external cream, APPLY EXTERNALLY TO THE AFFECTED AREA THREE TIMES DAILY      No current facility-administered medications for this encounter.       PRE-SEDATION ASSESSMENT:    Temp 97.6  F (36.4  C) (Tympanic)   Ht 1.784 m (5' 10.25\")   Wt 87.1 kg (192 lb)   LMP 08/04/2023   BMI 27.35 kg/m    Lung Exam:  Normal  Heart Exam:  Normal    Comment(s):      IMPRESSION:  Need for screening colonoscopy.    PLAN:  I discussed screening colonoscopy with the patient.       "

## 2023-09-06 NOTE — ANESTHESIA PREPROCEDURE EVALUATION
Anesthesia Pre-Procedure Evaluation    Patient: Coni Root   MRN: [id number] : 1980        Procedure : Procedure(s):  Colonoscopy          History reviewed. No pertinent past medical history.   Past Surgical History:   Procedure Laterality Date    NO HISTORY OF SURGERY        No Known Allergies   Social History     Tobacco Use    Smoking status: Never    Smokeless tobacco: Never   Substance Use Topics    Alcohol use: Yes     Comment: Occ.      Wt Readings from Last 1 Encounters:   23 87.1 kg (192 lb)        Anesthesia Evaluation   Pt has had prior anesthetic.     No history of anesthetic complications       ROS/MED HX  ENT/Pulmonary:  - neg pulmonary ROS     Neurologic:  - neg neurologic ROS     Cardiovascular:  - neg cardiovascular ROS     METS/Exercise Tolerance: >4 METS    Hematologic:  - neg hematologic  ROS     Musculoskeletal:  - neg musculoskeletal ROS     GI/Hepatic:  - neg GI/hepatic ROS     Renal/Genitourinary:  - neg Renal ROS     Endo:  - neg endo ROS     Psychiatric/Substance Use:  - neg psychiatric ROS     Infectious Disease:  - neg infectious disease ROS     Malignancy:  - neg malignancy ROS     Other:  - neg other ROS          Physical Exam    Airway  airway exam normal      Mallampati: II   TM distance: > 3 FB   Neck ROM: full   Mouth opening: > 3 cm    Respiratory Devices and Support         Dental       (+) Completely normal teeth      Cardiovascular   cardiovascular exam normal       Rhythm and rate: regular and normal     Pulmonary   pulmonary exam normal        breath sounds clear to auscultation           OUTSIDE LABS:  CBC:   Lab Results   Component Value Date    WBC 6.0 2023    WBC 5.9 10/19/2020    HGB 14.4 2023    HGB 12.9 10/19/2020    HCT 41.5 2023    HCT 39.0 10/19/2020     2023     10/19/2020     BMP:   Lab Results   Component Value Date     2023    POTASSIUM 4.2 2023    CHLORIDE 102 2023    CO2 23  08/04/2023    BUN 11.0 08/04/2023    CR 1.08 (H) 08/04/2023    GLC 89 08/04/2023    GLC 95 07/12/2019     COAGS: No results found for: PTT, INR, FIBR  POC:   Lab Results   Component Value Date    HCG Negative 01/27/2021     HEPATIC:   Lab Results   Component Value Date    ALBUMIN 4.5 08/04/2023    PROTTOTAL 7.1 08/04/2023    ALT 15 08/04/2023    AST 25 08/04/2023    ALKPHOS 56 08/04/2023    BILITOTAL 0.4 08/04/2023     OTHER:   Lab Results   Component Value Date    POLA 9.2 08/04/2023    TSH 2.40 08/04/2023    T4 0.79 05/05/2017    SED 7 05/05/2017       Anesthesia Plan    ASA Status:  1    NPO Status:  NPO Appropriate    Anesthesia Type: MAC.   Induction: Propofol.           Consents    Anesthesia Plan(s) and associated risks, benefits, and realistic alternatives discussed. Questions answered and patient/representative(s) expressed understanding.     - Discussed: Risks, Benefits and Alternatives for BOTH SEDATION and the PROCEDURE were discussed     - Discussed with:  Patient      - Extended Intubation/Ventilatory Support Discussed: No.      - Patient is DNR/DNI Status: No     Use of blood products discussed: No .     Postoperative Care            Comments:                ROSS GALINDO CRNA

## 2023-09-12 ENCOUNTER — OFFICE VISIT (OUTPATIENT)
Dept: SURGERY | Facility: OTHER | Age: 43
End: 2023-09-12
Attending: SURGERY
Payer: COMMERCIAL

## 2023-09-12 VITALS
DIASTOLIC BLOOD PRESSURE: 78 MMHG | OXYGEN SATURATION: 98 % | TEMPERATURE: 98.8 F | HEART RATE: 72 BPM | SYSTOLIC BLOOD PRESSURE: 110 MMHG | BODY MASS INDEX: 27.5 KG/M2 | WEIGHT: 193 LBS | RESPIRATION RATE: 14 BRPM

## 2023-09-12 DIAGNOSIS — K64.4 ANAL SKIN TAG: Primary | ICD-10-CM

## 2023-09-12 PROCEDURE — 99214 OFFICE O/P EST MOD 30 MIN: CPT | Performed by: SURGERY

## 2023-09-12 RX ORDER — ACETAMINOPHEN 325 MG/1
975 TABLET ORAL ONCE
Status: CANCELLED | OUTPATIENT
Start: 2023-09-12 | End: 2023-09-12

## 2023-09-12 ASSESSMENT — PAIN SCALES - GENERAL: PAINLEVEL: NO PAIN (0)

## 2023-09-12 NOTE — H&P (VIEW-ONLY)
Primary Care Physician: Tawny Sherman    A copy of this note will be sent to Tawny Sherman MD.    HPI:   The patient is 43 year old female with anal pain that has been present on and off for years. She recently had a colonoscopy-no polyps. It was noted that she had a large perianal skin tag. There hasn't been any bleeding recently. There hasn't been any drainage of stool. Bowel movements are pretty regular. Last colonoscopy 9/06/2023 with me. Repeating in 5 years due to father dying from colon cancer.    CONSULTATION ASSESSMENT AND PLAN/RECOMMENDATIONS:   Assessment:  anal skin tag  Plan:   I discussed with the patient the pathophysiology of anal skin tags and hemorrhoids. We discussed the risks, benefits and alternatives to excision in the OR to treat the tag. I specifically explained the risks of infection, bleeding, pain and hemorrhoid recurrence.The patient expressed understanding and wishes to proceed. We will schedule this at a time that is is convenient for the patient. Informed consent discussion was completed. The patient will call with questions or concerns.The patient denies questions at this time.    REVIEW OF SYSTEMS  GENERAL: No fevers or chills. Denies fatigue, recent weight loss.  HEENT: No sinus drainage. No changes with vision or hearing. No difficultyswallowing.   LYMPHATICS:  No swollen nodes in axilla, neck or groin.  CARDIOVASCULAR: Denies chest pain, palpitations and dyspnea on exertion.  PULMONARY: No shortness of breath or cough. No increase in sputum production.  GI: Denies melena. No hematemesis. No constipation or diarrhea.  : No dysuria or hematuria.  SKIN: No recent rashes or ulcers.   HEMATOLOGY:  No history of easy bruising or bleeding.  ENDOCRINE:  No history of diabetes or thyroid problems.  NEUROLOGY:  No history of seizures or headaches. No motor or sensory changes.    No past medical history on file.  Past Surgical History:   Procedure Laterality Date     COLONOSCOPY N/A 2023    normal, f/u 5 year due to family history    NO HISTORY OF SURGERY       Current Outpatient Medications   Medication    FLUoxetine (PROZAC) 20 MG capsule    levonorgestrel (MIRENA) 20 MCG/24HR IUD    triamcinolone (KENALOG) 0.1 % external cream     No current facility-administered medications for this visit.     No Known Allergies  Family History   Problem Relation Age of Onset    Osteoporosis Mother     Colon Cancer Father 71        Cancer-colon, age 72    Family History Negative Brother         Good Health      Social History     Socioeconomic History    Marital status:      Spouse name: None    Number of children: None    Years of education: None    Highest education level: None   Tobacco Use    Smoking status: Never    Smokeless tobacco: Never   Vaping Use    Vaping Use: Never used   Substance and Sexual Activity    Alcohol use: Yes     Alcohol/week: 1.0 standard drink of alcohol     Types: 1 Standard drinks or equivalent per week    Drug use: Never     Comment: Drug use: No    Sexual activity: Yes     Partners: Male     Birth control/protection: Male Surgical, I.U.D.     Comment: Mirena placed 20   Social History Narrative    , 2 kids (a son and daughter).  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     The above history was reviewed today 2023  PHYSICAL EXAM  Vitals: /78 (BP Location: Right arm, Patient Position: Sitting, Cuff Size: Adult Large)   Pulse 72   Temp 98.8  F (37.1  C) (Tympanic)   Resp 14   Wt 87.5 kg (193 lb)   LMP 2023   SpO2 98%   BMI 27.50 kg/m    BMI= Body mass index is 27.5 kg/m .  GENERAL: Healthy appearing patient in no acute distress. Pleasant and cooperative with exam and interview.   HEENT: Head-normocephalic. Eyes-no scleral icterus. Nose-no nasal drainage. No lesions. Mouth-oral mucosa pink and moist, no lesions.  NECK: Supple. No thyroid nodules. Trachea  midline.  LYMPHATICS:  No cervical, axillary or supraclavicular adenopathy.  CV: Regular rate and rhythm, no murmurs. No peripheral edema.  LUNGS:  No respiratory distress. Clear bilaterally to auscultation.  ABDOMEN: Non distended. Bowel sounds active. Soft, non-tender, no   SKIN: Pink, warm and dry. No jaundice. No rash.  NEURO:  Cranial nerves II-XII grossly intact. Alert and oriented.  PSYCH: Appropriate mood and affect.  RECTAL: deferred had last week at colonoscopy    I personally reviewed patient colonoscopy photos.

## 2023-09-12 NOTE — NURSING NOTE
"Chief Complaint   Patient presents with    RECHECK     Skin tags       Initial /78 (BP Location: Right arm, Patient Position: Sitting, Cuff Size: Adult Large)   Pulse 72   Temp 98.8  F (37.1  C) (Tympanic)   Resp 14   Wt 87.5 kg (193 lb)   LMP 08/04/2023   SpO2 98%   BMI 27.50 kg/m   Estimated body mass index is 27.5 kg/m  as calculated from the following:    Height as of 9/6/23: 1.784 m (5' 10.25\").    Weight as of this encounter: 87.5 kg (193 lb).  Medication Reconciliation: complete    Allie Oates LPN  "

## 2023-09-19 ENCOUNTER — ANESTHESIA EVENT (OUTPATIENT)
Dept: SURGERY | Facility: OTHER | Age: 43
End: 2023-09-19
Payer: COMMERCIAL

## 2023-09-20 ENCOUNTER — ANESTHESIA (OUTPATIENT)
Dept: SURGERY | Facility: OTHER | Age: 43
End: 2023-09-20
Payer: COMMERCIAL

## 2023-09-20 ENCOUNTER — HOSPITAL ENCOUNTER (OUTPATIENT)
Facility: OTHER | Age: 43
Discharge: HOME | End: 2023-09-20
Attending: SURGERY | Admitting: SURGERY
Payer: COMMERCIAL

## 2023-09-20 VITALS
WEIGHT: 193 LBS | TEMPERATURE: 97.5 F | HEART RATE: 58 BPM | DIASTOLIC BLOOD PRESSURE: 53 MMHG | RESPIRATION RATE: 18 BRPM | HEIGHT: 70 IN | OXYGEN SATURATION: 99 % | BODY MASS INDEX: 27.63 KG/M2 | SYSTOLIC BLOOD PRESSURE: 106 MMHG

## 2023-09-20 DIAGNOSIS — K64.4 ANAL SKIN TAG: ICD-10-CM

## 2023-09-20 PROCEDURE — 999N000141 HC STATISTIC PRE-PROCEDURE NURSING ASSESSMENT: Performed by: SURGERY

## 2023-09-20 PROCEDURE — 272N000001 HC OR GENERAL SUPPLY STERILE: Performed by: SURGERY

## 2023-09-20 PROCEDURE — 250N000009 HC RX 250: Performed by: NURSE ANESTHETIST, CERTIFIED REGISTERED

## 2023-09-20 PROCEDURE — 370N000017 HC ANESTHESIA TECHNICAL FEE, PER MIN: Performed by: SURGERY

## 2023-09-20 PROCEDURE — 250N000009 HC RX 250: Performed by: SURGERY

## 2023-09-20 PROCEDURE — 250N000011 HC RX IP 250 OP 636: Performed by: NURSE ANESTHETIST, CERTIFIED REGISTERED

## 2023-09-20 PROCEDURE — 46220 EXCISE ANAL EXT TAG/PAPILLA: CPT | Performed by: NURSE ANESTHETIST, CERTIFIED REGISTERED

## 2023-09-20 PROCEDURE — 258N000003 HC RX IP 258 OP 636: Performed by: SURGERY

## 2023-09-20 PROCEDURE — 272N000002 HC OR SUPPLY OTHER OPNP: Performed by: SURGERY

## 2023-09-20 PROCEDURE — 710N000012 HC RECOVERY PHASE 2, PER MINUTE: Performed by: SURGERY

## 2023-09-20 PROCEDURE — 250N000013 HC RX MED GY IP 250 OP 250 PS 637: Performed by: SURGERY

## 2023-09-20 PROCEDURE — 360N000074 HC SURGERY LEVEL 1, PER MIN: Performed by: SURGERY

## 2023-09-20 PROCEDURE — 46220 EXCISE ANAL EXT TAG/PAPILLA: CPT | Performed by: SURGERY

## 2023-09-20 PROCEDURE — 710N000010 HC RECOVERY PHASE 1, LEVEL 2, PER MIN: Performed by: SURGERY

## 2023-09-20 PROCEDURE — 88304 TISSUE EXAM BY PATHOLOGIST: CPT

## 2023-09-20 RX ORDER — SODIUM CHLORIDE, SODIUM LACTATE, POTASSIUM CHLORIDE, CALCIUM CHLORIDE 600; 310; 30; 20 MG/100ML; MG/100ML; MG/100ML; MG/100ML
INJECTION, SOLUTION INTRAVENOUS CONTINUOUS
Status: DISCONTINUED | OUTPATIENT
Start: 2023-09-20 | End: 2023-09-20 | Stop reason: HOSPADM

## 2023-09-20 RX ORDER — OXYCODONE HYDROCHLORIDE 5 MG/1
5 TABLET ORAL
Status: DISCONTINUED | OUTPATIENT
Start: 2023-09-20 | End: 2023-09-20 | Stop reason: HOSPADM

## 2023-09-20 RX ORDER — KETAMINE HYDROCHLORIDE 10 MG/ML
INJECTION INTRAMUSCULAR; INTRAVENOUS PRN
Status: DISCONTINUED | OUTPATIENT
Start: 2023-09-20 | End: 2023-09-20

## 2023-09-20 RX ORDER — PROPOFOL 10 MG/ML
INJECTION, EMULSION INTRAVENOUS PRN
Status: DISCONTINUED | OUTPATIENT
Start: 2023-09-20 | End: 2023-09-20

## 2023-09-20 RX ORDER — KETOROLAC TROMETHAMINE 30 MG/ML
INJECTION, SOLUTION INTRAMUSCULAR; INTRAVENOUS PRN
Status: DISCONTINUED | OUTPATIENT
Start: 2023-09-20 | End: 2023-09-20

## 2023-09-20 RX ORDER — LIDOCAINE HYDROCHLORIDE 20 MG/ML
INJECTION, SOLUTION INFILTRATION; PERINEURAL PRN
Status: DISCONTINUED | OUTPATIENT
Start: 2023-09-20 | End: 2023-09-20

## 2023-09-20 RX ORDER — DIBUCAINE 0.28 G/28G
OINTMENT TOPICAL PRN
Status: DISCONTINUED | OUTPATIENT
Start: 2023-09-20 | End: 2023-09-20 | Stop reason: HOSPADM

## 2023-09-20 RX ORDER — CHLOROPROCAINE HYDROCHLORIDE 20 MG/ML
INJECTION, SOLUTION EPIDURAL; INFILTRATION; INTRACAUDAL; PERINEURAL PRN
Status: DISCONTINUED | OUTPATIENT
Start: 2023-09-20 | End: 2023-09-20

## 2023-09-20 RX ORDER — DEXAMETHASONE SODIUM PHOSPHATE 4 MG/ML
INJECTION, SOLUTION INTRA-ARTICULAR; INTRALESIONAL; INTRAMUSCULAR; INTRAVENOUS; SOFT TISSUE PRN
Status: DISCONTINUED | OUTPATIENT
Start: 2023-09-20 | End: 2023-09-20

## 2023-09-20 RX ORDER — PROPOFOL 10 MG/ML
INJECTION, EMULSION INTRAVENOUS CONTINUOUS PRN
Status: DISCONTINUED | OUTPATIENT
Start: 2023-09-20 | End: 2023-09-20

## 2023-09-20 RX ORDER — OXYCODONE HYDROCHLORIDE 5 MG/1
5 TABLET ORAL EVERY 6 HOURS PRN
Qty: 6 TABLET | Refills: 0 | Status: SHIPPED | OUTPATIENT
Start: 2023-09-20

## 2023-09-20 RX ORDER — BUPIVACAINE HYDROCHLORIDE AND EPINEPHRINE 5; 5 MG/ML; UG/ML
INJECTION, SOLUTION EPIDURAL; INTRACAUDAL; PERINEURAL PRN
Status: DISCONTINUED | OUTPATIENT
Start: 2023-09-20 | End: 2023-09-20 | Stop reason: HOSPADM

## 2023-09-20 RX ORDER — ACETAMINOPHEN 325 MG/1
975 TABLET ORAL ONCE
Status: COMPLETED | OUTPATIENT
Start: 2023-09-20 | End: 2023-09-20

## 2023-09-20 RX ADMIN — KETOROLAC TROMETHAMINE 30 MG: 30 INJECTION, SOLUTION INTRAMUSCULAR at 12:48

## 2023-09-20 RX ADMIN — Medication 30 MG: at 12:33

## 2023-09-20 RX ADMIN — ACETAMINOPHEN 975 MG: 325 TABLET ORAL at 11:34

## 2023-09-20 RX ADMIN — PROPOFOL 100 MCG/KG/MIN: 10 INJECTION, EMULSION INTRAVENOUS at 12:39

## 2023-09-20 RX ADMIN — LIDOCAINE HYDROCHLORIDE 40 MG: 20 INJECTION, SOLUTION INFILTRATION; PERINEURAL at 12:39

## 2023-09-20 RX ADMIN — CHLOROPROCAINE HYDROCHLORIDE 2.5 ML: 20 INJECTION, SOLUTION EPIDURAL; INFILTRATION; INTRACAUDAL; PERINEURAL at 12:37

## 2023-09-20 RX ADMIN — DEXAMETHASONE SODIUM PHOSPHATE 4 MG: 4 INJECTION, SOLUTION INTRA-ARTICULAR; INTRALESIONAL; INTRAMUSCULAR; INTRAVENOUS; SOFT TISSUE at 12:48

## 2023-09-20 RX ADMIN — PROPOFOL 80 MG: 10 INJECTION, EMULSION INTRAVENOUS at 12:39

## 2023-09-20 RX ADMIN — MIDAZOLAM HYDROCHLORIDE 2 MG: 1 INJECTION, SOLUTION INTRAMUSCULAR; INTRAVENOUS at 12:33

## 2023-09-20 RX ADMIN — SODIUM CHLORIDE, POTASSIUM CHLORIDE, SODIUM LACTATE AND CALCIUM CHLORIDE: 600; 310; 30; 20 INJECTION, SOLUTION INTRAVENOUS at 11:45

## 2023-09-20 ASSESSMENT — ACTIVITIES OF DAILY LIVING (ADL)
ADLS_ACUITY_SCORE: 35

## 2023-09-20 NOTE — ANESTHESIA CARE TRANSFER NOTE
Patient: Coni Root    Procedure: Procedure(s):  REMOVAL, SKIN TAG, ANUS       Diagnosis: Anal skin tag [K64.4]  Diagnosis Additional Information: No value filed.    Anesthesia Type:   Spinal     Note:    Oropharynx: oropharynx clear of all foreign objects and spontaneously breathing  Level of Consciousness: awake  Oxygen Supplementation: room air    Independent Airway: airway patency satisfactory and stable  Dentition: dentition unchanged  Vital Signs Stable: post-procedure vital signs reviewed and stable  Report to RN Given: handoff report given  Patient transferred to: PACU    Handoff Report: Identifed the Patient, Identified the Reponsible Provider, Reviewed the pertinent medical history, Discussed the surgical course, Reviewed Intra-OP anesthesia mangement and issues during anesthesia, Set expectations for post-procedure period and Allowed opportunity for questions and acknowledgement of understanding    Vitals:  Vitals Value Taken Time   BP     Temp     Pulse     Resp     SpO2 99 % 09/20/23 1258   Vitals shown include unvalidated device data.    Electronically Signed By: ROSS Esteban CRNA  September 20, 2023  1:00 PM

## 2023-09-20 NOTE — INTERVAL H&P NOTE
"I have reviewed the surgical (or preoperative) H&P that is linked to this encounter, and examined the patient. There are no significant changes. I discussed excision of anal skin tag with the patient.     Clinical Conditions Present on Arrival:  Clinically Significant Risk Factors Present on Admission                  # Overweight: Estimated body mass index is 27.5 kg/m  as calculated from the following:    Height as of this encounter: 1.784 m (5' 10.25\").    Weight as of this encounter: 87.5 kg (193 lb).       "

## 2023-09-20 NOTE — OR NURSING
Margaret has been discharged to home at 1519 via ambulatory accompanied by SAMUEL Pollard.    Written discharge instructions were provided to Margaret.  Prescriptions were escribed.  Patient states their pain is 0/10, and denies any nausea or dizziness upon discharge.    Patient and adult caring for them verbalize understanding of discharge instructions including no driving until tomorrow and no longer taking narcotic pain medications - no operating mechanical equipment and no making any important decisions.They understand reason for discharge, and necessary follow-up appointments.

## 2023-09-20 NOTE — OR NURSING
PACU Transfer Note    Coni Root was transferred to  via cart.  Equipment used for transport:  none.  Accompanied by:  self  Prescriptions were: none    PACU Respiratory Event Documentation     1) Episodes of Apnea greater than or equal to 10 seconds: no    2) Bradypnea - less than 8 breaths per minute: 16    3) Pain score on 0 to 10 scale: 0    4) Pain-sedation mismatch (yes or no): no    5) Repeated 02 desaturation less than 90% (yes or no): no    Anesthesia notified? (yes or no): no    Any of the above events occuring repeatedly in separate 30 minute intervals may be considered recurrent PACU respiratory events.    Patient stable and meets phase 1 discharge criteria for transport from PACU.  Report given to Iraida at bedside.  Cari Orozco RN on 9/20/2023 at 1:41 PM

## 2023-09-20 NOTE — OP NOTE
Preoperative Diagnosis: anal skin tag    Postoperative Diagnosis: anal skin tag     Procedure Planned: Excision of anal skin tag     Procedure Performed: Excision of anal skin tag     Surgeon: Rosamaria Romero MD   Circulator: Shira Blackburn RN  Scrub Person: Jaclyn Corrales Patient Positioning: Gayla Zamora RN  Anesthesia: Monitored anesthesia care, local     Specimen: anal skin tag    Estimated Blood Loss: 0 mL     INDICATIONS   I discussed the risks,benefits and alternatives to excision of anal skin tags with the patient. We specifically discussed the risks of infection, bleeding, pain, hemorrhoid recurrence and the possible need for further procedures. The patient'squestions were answered and understanding was expressed. Informed consent paperwork was completed.   Procedure:     The patient was placed in a lateral position on the procedure table. External exam revealed an left sided anal skin tag in the anterior position. Digital exam revealed no masses. Local anesthetic was infiltrated at the base of the external anal skin tag. Ligasure was used to amputate the skin tag.  Hemostasis was excellent. The patient tolerated the procedure with immediately apparent complications. The patient was taken to post surgery recovery in stable condition.

## 2023-09-20 NOTE — ANESTHESIA POSTPROCEDURE EVALUATION
Patient: Coni Root    Procedure: Procedure(s):  REMOVAL, SKIN TAG, ANUS       Anesthesia Type:  Spinal    Note:  Disposition: Outpatient   Postop Pain Control: Uneventful            Sign Out: Well controlled pain   PONV: No   Neuro/Psych: Uneventful            Sign Out: Acceptable/Baseline neuro status   Airway/Respiratory: Uneventful            Sign Out: Acceptable/Baseline resp. status   CV/Hemodynamics: Uneventful            Sign Out: Acceptable CV status; No obvious hypovolemia; No obvious fluid overload   Other NRE: NONE   DID A NON-ROUTINE EVENT OCCUR? No           Last vitals:  Vitals Value Taken Time   /67 09/20/23 1320   Temp 97.8  F (36.6  C) 09/20/23 1315   Pulse 57 09/20/23 1320   Resp 26 09/20/23 1321   SpO2 100 % 09/20/23 1321   Vitals shown include unvalidated device data.    Electronically Signed By: ROSS Villegas CRNA  September 20, 2023  2:59 PM

## 2023-09-20 NOTE — OR NURSING
"Patient's spinal has worn off.  Ambulated around the loop in Day Surgery without difficulty.  Patient ambulated into bathroom and was able to empty.  She states it feels like \"one of those times when you are in the woods and you pee for a really long time.\"  Bladder scan done showing approximately 86ml post void.  LM for MD to return my call.  Will proceed with discharge paperwork.  "

## 2023-09-20 NOTE — DISCHARGE INSTRUCTIONS
Procedure you had done: excision of an anal skin tag  Your health care provider is:  Tawny Sherman  Your surgeon is Dr. Rosamaria Romero.   Please call your health care provider or surgeon at [phone] if:    - you feel you are getting worse or having an increase in problems    - fever greater than 101 degrees  - increasing shortness of breath or chest pain  - any signs of infection (increasing redness, swelling, tenderness, warmth, change in appearance, or  increased drainage)  - nausea (upset stomach) and vomiting and/or diarrhea that will not stop  - severe pain that is not relieved by medicine, rest or ice    Home care :  You will be discharged when you are safe to go home. Anesthesia can change judgment, reaction time and coordination for several hours after you seem back to normal. Therefore, do not operate any motorized vehicles or power tools for 24 hours after discharge.     Activity:  You should rest or do quiet activities for the rest of the day. The day after surgery you may be as active as you feel able. You may find that you require more rest than usual the first 3-4 days as your body heals.      Diet:  Eat small amounts frequently after arriving home. Avoid foods that are hard to digest such as heavy, sweet, spicy, or fried foods until you are feeling well.   Vomiting can occur after general anesthesia. If vomiting lasts more than 5-7 times after arriving home, or if you have other difficulties, call your doctor.     Other:  Problems urinating can happen after surgery.  Please call your physician if you have any problems.  Some people have constipation after surgery-you can use over the counter stool softener or laxative as needed.  You can use ice on the area of the incision to help with pain and swelling. Apply an ice pack wrapped in a towel to the area for 10-15 minutes once an hour.   Dressing:  Your incision was covered with Steri-strips and a bandage. The bandage can be removed and you  can shower 24 hours or more after the surgery. After you shower dry your incision gently. You may apply a clean bandage if you want to. The Steri-strips will stay on for 5-7 days.   No soaking in a bath, hot tub, pool or lake for 5 days.      Drainage:   Bleeding or drainage should be minimal.  If bleeding soaks the dressings, cover with another sterile dressing.  If bleeding continues, apply gentle steady pressure over the incision for 5 minutes.  If bleeding persists or there is an increased swelling of the area, call your surgeon or go to the Emergency Room.

## 2023-09-25 LAB
PATH REPORT.COMMENTS IMP SPEC: NORMAL
PATH REPORT.FINAL DX SPEC: NORMAL
PATH REPORT.RELEVANT HX SPEC: NORMAL
PHOTO IMAGE: NORMAL

## 2023-10-03 ENCOUNTER — OFFICE VISIT (OUTPATIENT)
Dept: SURGERY | Facility: OTHER | Age: 43
End: 2023-10-03
Attending: SURGERY
Payer: COMMERCIAL

## 2023-10-03 VITALS
SYSTOLIC BLOOD PRESSURE: 102 MMHG | RESPIRATION RATE: 14 BRPM | DIASTOLIC BLOOD PRESSURE: 64 MMHG | OXYGEN SATURATION: 98 % | HEART RATE: 71 BPM | TEMPERATURE: 99.5 F

## 2023-10-03 DIAGNOSIS — K64.4 EXTERNAL HEMORRHOID: ICD-10-CM

## 2023-10-03 DIAGNOSIS — Z09 FOLLOW-UP EXAMINATION AFTER GASTROINTESTINAL SURGERY: Primary | ICD-10-CM

## 2023-10-03 PROCEDURE — 99212 OFFICE O/P EST SF 10 MIN: CPT | Performed by: SURGERY

## 2023-10-03 NOTE — PROGRESS NOTES
Patient presents for post surgical visit after excision of an external hemorrhoid on 9/20/23. Patient has done well. No problems with incision. Some minimal bleeding and pain with bowel movements initially but that has improved.     /64 (BP Location: Right arm, Patient Position: Sitting, Cuff Size: Adult Large)   Pulse 71   Temp 99.5  F (37.5  C) (Tympanic)   Resp 14   LMP 09/07/2023 (Approximate)   SpO2 98%   General: NAD, pleasant and cooperative with exam and interview.  Anus: healing incision. No sign of infection. No pain with palpation.  Psychiatry: awake, alert and oriented. Appropriate affect.    Assessment/Plan:  Discussed surgery and pathology results. Patient can return to normal activities. Patient will call with questions or concerns.

## 2024-07-17 ENCOUNTER — OFFICE VISIT (OUTPATIENT)
Dept: FAMILY MEDICINE | Facility: OTHER | Age: 44
End: 2024-07-17
Attending: PHYSICIAN ASSISTANT
Payer: COMMERCIAL

## 2024-07-17 VITALS
SYSTOLIC BLOOD PRESSURE: 120 MMHG | WEIGHT: 193 LBS | RESPIRATION RATE: 18 BRPM | BODY MASS INDEX: 27.02 KG/M2 | DIASTOLIC BLOOD PRESSURE: 70 MMHG | HEIGHT: 71 IN | HEART RATE: 72 BPM

## 2024-07-17 DIAGNOSIS — B34.9 SYSTEMIC VIRAL ILLNESS: ICD-10-CM

## 2024-07-17 DIAGNOSIS — U07.1 INFECTION DUE TO 2019 NOVEL CORONAVIRUS: ICD-10-CM

## 2024-07-17 DIAGNOSIS — J02.9 SORE THROAT: ICD-10-CM

## 2024-07-17 DIAGNOSIS — H65.92 OME (OTITIS MEDIA WITH EFFUSION), LEFT: Primary | ICD-10-CM

## 2024-07-17 LAB
FLUAV RNA SPEC QL NAA+PROBE: NEGATIVE
FLUBV RNA RESP QL NAA+PROBE: NEGATIVE
GROUP A STREP BY PCR: NOT DETECTED
RSV RNA SPEC NAA+PROBE: NEGATIVE
SARS-COV-2 RNA RESP QL NAA+PROBE: POSITIVE

## 2024-07-17 PROCEDURE — 99213 OFFICE O/P EST LOW 20 MIN: CPT | Performed by: PHYSICIAN ASSISTANT

## 2024-07-17 PROCEDURE — 87651 STREP A DNA AMP PROBE: CPT | Mod: ZL | Performed by: PHYSICIAN ASSISTANT

## 2024-07-17 PROCEDURE — 87637 SARSCOV2&INF A&B&RSV AMP PRB: CPT | Mod: ZL | Performed by: PHYSICIAN ASSISTANT

## 2024-07-17 RX ORDER — PREDNISONE 20 MG/1
TABLET ORAL
Qty: 5 TABLET | Refills: 0 | Status: SHIPPED | OUTPATIENT
Start: 2024-07-17

## 2024-07-17 ASSESSMENT — PAIN SCALES - GENERAL: PAINLEVEL: EXTREME PAIN (9)

## 2024-07-17 NOTE — PROGRESS NOTES
Assessment & Plan       ICD-10-CM    1. OME (otitis media with effusion), left  H65.92 amoxicillin-clavulanate (AUGMENTIN) 875-125 MG tablet     predniSONE (DELTASONE) 20 MG tablet     Symptomatic Influenza A/B, RSV, & SARS-CoV2 PCR (COVID-19) Nose      2. Infection due to 2019 novel coronavirus  U07.1       3. Systemic viral illness  B34.9 predniSONE (DELTASONE) 20 MG tablet     Symptomatic Influenza A/B, RSV, & SARS-CoV2 PCR (COVID-19) Nose     CANCELED: Symptomatic Influenza A/B, RSV, & SARS-CoV2 PCR (COVID-19) Nose      4. Sore throat  J02.9 Group A Streptococcus PCR Throat Swab     predniSONE (DELTASONE) 20 MG tablet     Symptomatic Influenza A/B, RSV, & SARS-CoV2 PCR (COVID-19) Nose        Moderate left otitis media, treat with Augmentin. Discussed risks, benefits and side effects of medication at length with Margaret. Recommend Tylenol, Ibuprofen, warm compresses, etc. Return precautions reviewed.   COVID positive. Recommend to stay home for 5 days. If you have no symptoms or symptoms are resolving after 5 days, you may leave the house per CDC guidelines. Continue to wear a mask around others for 5 days. Continue symptomatic remedies such as salt water gargles, increase hydration, rest, alternating Tylenol and ibuprofen if able/needed, Vicks Vapor rub on the chest and other symptomatic remedies. You may test positive for up to 90 days on repeat testing. If interested in Paxlovid, she will reach out.   Illness symptoms x2 days. Update strep and multiplex swab. Strep negative, COVID, RSV positive, Influenza negative.   Sore throat, update strep swab today, strep results negative. Start oral Prednisone 40 mg by mouth daily in the morning with food x5 days. Recommend throat lozenges, humidifier, vaporizer, steamy showers, Tylenol, Ibuprofen and other symptomatic remedies of care. Return precautions reviewed.     BMI  Estimated body mass index is 26.92 kg/m  as calculated from the following:    Height as of this  "encounter: 1.803 m (5' 11\").    Weight as of this encounter: 87.5 kg (193 lb).   Weight management plan: Discussed healthy diet and exercise guidelines    See Patient Instructions    Return if symptoms worsen or fail to improve.    Subjective   Margaret is a 44 year old, presenting for the following health issues:  Pharyngitis        8/4/2023     2:53 PM   Additional Questions   Roomed by May Awad     HPI     Margaret presents with concerns of illness which began 2-days ago.     Acute Illness  Acute illness concerns: sore throat, cough  Onset/Duration: 2-days  Symptoms:  Fever: No  Chills/Sweats: YES  Headache (location?): YES- frontal  Sinus Pressure: YES  Conjunctivitis:  No  Ear Pain: YES- pressure  Rhinorrhea: YES  Congestion: YES  Sore Throat: YES  Cough: YES-non-productive  Wheeze: No  Decreased Appetite: YES  Nausea: No  Vomiting: No  Diarrhea: No  Dysuria/Freq.: No  Dysuria or Hematuria: No  Fatigue/Achiness: YES  Sick/Strep Exposure: No  Therapies tried and outcome: Nyquil and Advil cold and sinus    Review of Systems  Constitutional, HEENT, cardiovascular, pulmonary, GI, , musculoskeletal, neuro, skin, endocrine and psych systems are negative, except as otherwise noted.      Objective    /70 (BP Location: Left arm, Patient Position: Sitting, Cuff Size: Adult Regular)   Pulse 72   Resp 18   Ht 1.803 m (5' 11\")   Wt 87.5 kg (193 lb)   BMI 26.92 kg/m    Body mass index is 26.92 kg/m .  Physical Exam   GENERAL: alert and no distress  EYES: Eyes grossly normal to inspection  HENT: normal cephalic/atraumatic, right ear: normal: no effusions, no erythema, normal landmarks, left ear: clear effusion, erythematous, and bulging membrane, nose and mouth without ulcers or lesions, oropharynx clear, oral mucous membranes moist. + post nasal drip and posterior pharyngeal erythema  NECK: no adenopathy, no asymmetry, masses, or scars  RESP: lungs clear to auscultation - no rales, rhonchi or wheezes  CV: regular rate " and rhythm, normal S1 S2, no S3 or S4, no murmur, click or rub, no peripheral edema  MS: no gross musculoskeletal defects noted, no edema  SKIN: no suspicious lesions or rashes  PSYCH: mentation appears normal, affect normal/bright  LYMPH: normal ant/post cervical, supraclavicular nodes    Results for orders placed or performed in visit on 07/17/24   Symptomatic Influenza A/B, RSV, & SARS-CoV2 PCR (COVID-19) Nose     Status: Abnormal    Specimen: Nose; Swab   Result Value Ref Range    Influenza A PCR Negative Negative    Influenza B PCR Negative Negative    RSV PCR Negative Negative    SARS CoV2 PCR Positive (A) Negative    Narrative    Testing was performed using the Xpert Xpress CoV2/Flu/RSV Assay on the [employer]. This test should be ordered for the detection of SARS-CoV-2, influenza, and RSV viruses in individuals who meet clinical and/or epidemiological criteria. Test performance is unknown in asymptomatic patients. This test is for in vitro diagnostic use under the FDA EUA for laboratories certified under CLIA to perform high or moderate complexity testing. This test has not been FDA cleared or approved. A negative result does not rule out the presence of PCR inhibitors in the specimen or target RNA in concentration below the limit of detection for the assay. If only one viral target is positive but coinfection with multiple targets is suspected, the sample should be re-tested with another FDA cleared, approved, or authorized test, if coinfection would change clinical management. This test was validated by the [facility] [employer]. These laboratories are certified under the Clinical Laboratory Improvement Amendments of 1988 (CLIA-88) as qualified to perform high complexity laboratory testing.   Group A Streptococcus PCR Throat Swab     Status: Normal    Specimen: Throat; Swab   Result Value Ref Range    Group A strep by PCR Not Detected Not Detected    Narrative    The Xpert Xpress  Strep A test, performed on the [employer], is a rapid, qualitative in vitro diagnostic test for the detection of Streptococcus pyogenes (Group A ß-hemolytic Streptococcus, Strep A) in throat swab specimens from patients with signs and symptoms of pharyngitis. The Xpert Xpress Strep A test can be used as an aid in the diagnosis of Group A Streptococcal pharyngitis. The assay is not intended to monitor treatment for Group A Streptococcus infections. The Xpert Xpress Strep A test utilizes an automated real-time polymerase chain reaction (PCR) to detect Streptococcus pyogenes DNA.       Signed Electronically by: Ksenia Tello PA-C

## 2024-09-21 ENCOUNTER — HEALTH MAINTENANCE LETTER (OUTPATIENT)
Age: 44
End: 2024-09-21

## 2024-10-28 DIAGNOSIS — F32.A DEPRESSION, UNSPECIFIED DEPRESSION TYPE: ICD-10-CM

## 2024-10-30 NOTE — TELEPHONE ENCOUNTER
[facility] sent Rx request for the following:      Requested Prescriptions   Pending Prescriptions Disp Refills    FLUoxetine (PROZAC) 20 MG capsule [Pharmacy Med Name: fluoxetine 20 mg capsule] 90 capsule 3     Sig: Take 1 capsule (20 mg) by mouth daily       SSRIs Protocol Failed - 10/30/2024  1:07 PM        Failed - PHQ-9 score less than 5 in past 6 months     Please review last PHQ-9 score.          Last Prescription Date:   8/4/23  Last Fill Qty/Refills:         90, R-3    Last Office Visit:              8/4/23   Future Office visit:            none    Routing refill request to provider for review/approval because:  Patient needs to be seen because it has been more than 1 year since last office visit.    Lakshmi Lema RN on 10/30/2024 at 1:08 PM

## 2025-03-03 ENCOUNTER — OFFICE VISIT (OUTPATIENT)
Dept: FAMILY MEDICINE | Facility: OTHER | Age: 45
End: 2025-03-03
Attending: STUDENT IN AN ORGANIZED HEALTH CARE EDUCATION/TRAINING PROGRAM
Payer: COMMERCIAL

## 2025-03-03 VITALS
HEIGHT: 71 IN | TEMPERATURE: 98.3 F | WEIGHT: 193 LBS | HEART RATE: 65 BPM | SYSTOLIC BLOOD PRESSURE: 111 MMHG | RESPIRATION RATE: 18 BRPM | OXYGEN SATURATION: 98 % | BODY MASS INDEX: 27.02 KG/M2 | DIASTOLIC BLOOD PRESSURE: 77 MMHG

## 2025-03-03 DIAGNOSIS — H66.001 NON-RECURRENT ACUTE SUPPURATIVE OTITIS MEDIA OF RIGHT EAR WITHOUT SPONTANEOUS RUPTURE OF TYMPANIC MEMBRANE: Primary | ICD-10-CM

## 2025-03-03 DIAGNOSIS — R09.81 NASAL CONGESTION: ICD-10-CM

## 2025-03-03 DIAGNOSIS — R05.1 ACUTE COUGH: ICD-10-CM

## 2025-03-03 DIAGNOSIS — H92.01 RIGHT EAR PAIN: ICD-10-CM

## 2025-03-03 PROCEDURE — 1125F AMNT PAIN NOTED PAIN PRSNT: CPT

## 2025-03-03 PROCEDURE — 250N000009 HC RX 250

## 2025-03-03 PROCEDURE — 3074F SYST BP LT 130 MM HG: CPT

## 2025-03-03 PROCEDURE — 99213 OFFICE O/P EST LOW 20 MIN: CPT

## 2025-03-03 PROCEDURE — 3078F DIAST BP <80 MM HG: CPT

## 2025-03-03 RX ORDER — AMOXICILLIN 500 MG/1
1000 CAPSULE ORAL 2 TIMES DAILY
Qty: 28 CAPSULE | Refills: 0 | Status: SHIPPED | OUTPATIENT
Start: 2025-03-03 | End: 2025-03-10

## 2025-03-03 RX ORDER — DEXAMETHASONE SODIUM PHOSPHATE 4 MG/ML
12 VIAL (ML) INJECTION ONCE
Status: COMPLETED | OUTPATIENT
Start: 2025-03-03 | End: 2025-03-03

## 2025-03-03 RX ADMIN — DEXAMETHASONE SODIUM PHOSPHATE 12 MG: 4 INJECTION, SOLUTION INTRAMUSCULAR; INTRAVENOUS at 11:38

## 2025-03-03 ASSESSMENT — PAIN SCALES - GENERAL: PAINLEVEL_OUTOF10: SEVERE PAIN (7)

## 2025-03-03 NOTE — PROGRESS NOTES
ASSESSMENT/PLAN:    I have reviewed the nursing notes.  I have reviewed the findings, diagnosis, plan and need for follow up with the patient.    1. Right ear pain  2. Acute cough  3. Nasal congestion  4. Non-recurrent acute suppurative otitis media of right ear without spontaneous rupture of tympanic membrane (Primary)    - amoxicillin (AMOXIL) 500 MG capsule; Take 2 capsules (1,000 mg) by mouth 2 times daily for 7 days.  Dispense: 28 capsule; Refill: 0    - dexAMETHasone (DECADRON) injectable solution used ORALLY 12 mg-ministered in clinic    - Please read the attached information on otitis media for at home care treatment as discussed in the clinic this morning.      - Symptomatic treatment - Encouraged fluids, salt water gargles, honey (only if greater than 1 year in age due to risk of botulism), elevation, humidifier, sinus rinse/netti pot, lozenges, tea, topical vapor rub, popsicles, rest, etc     - May use over-the-counter Tylenol and ibuprofen as needed for pain, inflammation or fever    - Discussed warning signs/symptoms indicative of need to f/u    - Follow up if symptoms persist or worsen or concerns    - I explained my diagnostic considerations and recommendations to the patient, who voiced understanding and agreement with the treatment plan. All questions were answered. We discussed potential side effects of any prescribed or recommended therapies, as well as expectations for response to treatments.    ROSS Graf CNP  3/3/2025  11:01 AM    HPI:    Coni Root is a 44 year old female who [facility] for concerns of possible right ear infection.  Patient states that all of last week she had cold symptoms.  Patient states that yesterday she began to have a headache and some right ear discomfort and woke up this morning with severe right ear pain.  Patient also having some congestion, cough and dizziness.  At home care treatment consists of Advil cold and sinus, Afrin,  "fluids and rest.  Patient states that she does have a history of ear infections when she gets congested.  Patient denies fever, chills, body aches, shortness of breath, chest discomfort, sore throat, rhinorrhea, lightheadedness, nausea, vomiting, diarrhea, constipation or rash.    No past medical history on file.  Past Surgical History:   Procedure Laterality Date    COLONOSCOPY N/A 09/06/2023    normal, f/u 5 year due to family history    NO HISTORY OF SURGERY      REMOVE TAG ANAL N/A 9/20/2023    Procedure: REMOVAL, SKIN TAG, ANUS;  Surgeon: Rosamaria Romero MD;  Location:  OR     Social History     Tobacco Use    Smoking status: Never    Smokeless tobacco: Never   Substance Use Topics    Alcohol use: Yes     Alcohol/week: 1.0 standard drink of alcohol     Types: 1 Standard drinks or equivalent per week     Current Outpatient Medications   Medication Sig Dispense Refill    FLUoxetine (PROZAC) 20 MG capsule Take 1 capsule (20 mg) by mouth daily 90 capsule 3    levonorgestrel (MIRENA) 20 MCG/24HR IUD 1 each by Intrauterine route once Placed 1/27/21      triamcinolone (KENALOG) 0.1 % external cream APPLY EXTERNALLY TO THE AFFECTED AREA THREE TIMES DAILY 30 g 3    oxyCODONE (ROXICODONE) 5 MG tablet Take 1 tablet (5 mg) by mouth every 6 hours as needed for moderate to severe pain (Patient not taking: Reported on 3/3/2025) 6 tablet 0    predniSONE (DELTASONE) 20 MG tablet Take 2 tablets (40 mg) by mouth daily in the morning with food for 5 days (Patient not taking: Reported on 3/3/2025) 5 tablet 0     No Known Allergies  Past medical history, past surgical history, current medications and allergies reviewed and accurate to the best of my knowledge.      ROS:  Refer to HPI    /77 (BP Location: Right arm, Patient Position: Sitting, Cuff Size: Adult Regular)   Pulse 65   Temp 98.3  F (36.8  C) (Temporal)   Resp 18   Ht 1.803 m (5' 11\")   Wt 87.5 kg (193 lb)   SpO2 98%   BMI 26.92 kg/m      EXAM:  General " Appearance: Well appearing 44 year old female, appropriate appearance for age. No acute distress   Ears: Left TM intact, translucent with bony landmarks appreciated, no erythema, no effusion, no bulging, no purulence.  Right TM intact, translucent with bony landmarks appreciated, mild erythema, no effusion, mild bulging, no purulence.  Left auditory canal clear.  Right auditory canal erythematous.  Normal external ears, non tender.  Eyes: conjunctivae normal without erythema or irritation, corneas clear, no drainage or crusting, no eyelid swelling, pupils equal   Oropharynx: moist mucous membranes, posterior pharynx without erythema, tonsils symmetric, no erythema, no exudates or petechiae, no post nasal drip seen, no trismus, voice clear.    Sinuses:  No sinus tenderness upon palpation of the frontal or maxillary sinuses  Nose:  Bilateral nares: no erythema, no edema, no drainage and + congestion   Neck: supple without adenopathy  Respiratory: normal chest wall and respirations.  Normal effort.  Clear to auscultation bilaterally, no wheezing, crackles or rhonchi.  No increased work of breathing.  No cough appreciated.  Cardiac: RRR with no murmurs    Musculoskeletal:  Equal movement of bilateral upper extremities.  Equal movement of bilateral lower extremities.  Normal gait.    Neuro: Alert and oriented to person, place, and time.    Psychological: normal affect, alert, oriented, and pleasant.

## 2025-03-03 NOTE — NURSING NOTE
"Chief Complaint   Patient presents with    Ear Problem     Sick last week. More pain today, Right    [facility] for concerns of right ear pain. Patient was sick last week and more pain happening today.     Initial /77 (BP Location: Right arm, Patient Position: Sitting, Cuff Size: Adult Regular)   Pulse 65   Temp 98.3  F (36.8  C) (Temporal)   Resp 18   Ht 1.803 m (5' 11\")   Wt 87.5 kg (193 lb)   SpO2 98%   BMI 26.92 kg/m   Estimated body mass index is 26.92 kg/m  as calculated from the following:    Height as of this encounter: 1.803 m (5' 11\").    Weight as of this encounter: 87.5 kg (193 lb).  Medication Review: complete    The next two questions are to help us understand your food security.  If you are feeling you need any assistance in this area, we have resources available to support you today.          3/3/2025   SDOH- Food Insecurity   Within the past 12 months, did you worry that your food would run out before you got money to buy more? N   Within the past 12 months, did the food you bought just not last and you didn t have money to get more? N         Health Care Directive:  Patient does not have a Health Care Directive: Discussed advance care planning with patient; however, patient declined at this time.    Claude Basurto      "

## 2025-04-23 ENCOUNTER — MYC REFILL (OUTPATIENT)
Dept: FAMILY MEDICINE | Facility: OTHER | Age: 45
End: 2025-04-23
Payer: COMMERCIAL

## 2025-04-23 DIAGNOSIS — F32.A DEPRESSION, UNSPECIFIED DEPRESSION TYPE: ICD-10-CM

## 2025-04-24 NOTE — TELEPHONE ENCOUNTER
Confirmed with [facility] refills are available and they will get that ready - informed Patient.  Mary Truong RN on 4/24/2025 at 11:06 AM

## 2025-06-11 ENCOUNTER — PATIENT OUTREACH (OUTPATIENT)
Dept: CARE COORDINATION | Facility: CLINIC | Age: 45
End: 2025-06-11
Payer: COMMERCIAL

## 2025-08-01 ENCOUNTER — HOSPITAL ENCOUNTER (OUTPATIENT)
Dept: MAMMOGRAPHY | Facility: OTHER | Age: 45
Discharge: HOME | End: 2025-08-01
Attending: FAMILY MEDICINE
Payer: COMMERCIAL

## 2025-08-01 DIAGNOSIS — Z12.31 VISIT FOR SCREENING MAMMOGRAM: ICD-10-CM

## 2025-08-01 PROCEDURE — 77067 SCR MAMMO BI INCL CAD: CPT | Mod: 26 | Performed by: RADIOLOGY

## 2025-08-01 PROCEDURE — 77063 BREAST TOMOSYNTHESIS BI: CPT | Mod: 26 | Performed by: RADIOLOGY

## 2025-08-01 PROCEDURE — 77063 BREAST TOMOSYNTHESIS BI: CPT

## 2025-08-09 ENCOUNTER — MYC MEDICAL ADVICE (OUTPATIENT)
Dept: FAMILY MEDICINE | Facility: OTHER | Age: 45
End: 2025-08-09
Payer: COMMERCIAL

## 2025-08-09 DIAGNOSIS — F32.A DEPRESSION, UNSPECIFIED DEPRESSION TYPE: Primary | ICD-10-CM

## 2025-08-09 DIAGNOSIS — N95.1 PERIMENOPAUSE: ICD-10-CM

## 2025-08-11 RX ORDER — VENLAFAXINE HYDROCHLORIDE 37.5 MG/1
37.5 CAPSULE, EXTENDED RELEASE ORAL DAILY
Qty: 7 CAPSULE | Refills: 0 | Status: SHIPPED | OUTPATIENT
Start: 2025-08-11 | End: 2025-08-18

## 2025-08-11 RX ORDER — VENLAFAXINE HYDROCHLORIDE 75 MG/1
75 CAPSULE, EXTENDED RELEASE ORAL DAILY
Qty: 90 CAPSULE | Refills: 3 | Status: SHIPPED | OUTPATIENT
Start: 2025-08-11

## 2025-08-11 RX ORDER — FLUOXETINE 10 MG/1
10 CAPSULE ORAL DAILY
Qty: 7 CAPSULE | Refills: 0 | Status: SHIPPED | OUTPATIENT
Start: 2025-08-11 | End: 2025-08-18

## 2025-08-11 RX ORDER — VENLAFAXINE HYDROCHLORIDE 37.5 MG/1
37.5 CAPSULE, EXTENDED RELEASE ORAL DAILY
Qty: 90 CAPSULE | Refills: 3 | Status: SHIPPED | OUTPATIENT
Start: 2025-08-11 | End: 2025-08-11

## 2025-08-21 ENCOUNTER — HOSPITAL ENCOUNTER (OUTPATIENT)
Dept: ULTRASOUND IMAGING | Facility: OTHER | Age: 45
End: 2025-08-21
Attending: FAMILY MEDICINE
Payer: COMMERCIAL

## 2025-08-21 DIAGNOSIS — R92.8 ABNORMAL FINDING ON BREAST IMAGING: ICD-10-CM

## 2025-08-21 PROCEDURE — 76642 ULTRASOUND BREAST LIMITED: CPT | Mod: RT

## 2025-08-27 ENCOUNTER — HOSPITAL ENCOUNTER (OUTPATIENT)
Dept: MAMMOGRAPHY | Facility: OTHER | Age: 45
Discharge: HOME | End: 2025-08-27
Attending: FAMILY MEDICINE
Payer: COMMERCIAL

## 2025-08-27 ENCOUNTER — HOSPITAL ENCOUNTER (OUTPATIENT)
Dept: ULTRASOUND IMAGING | Facility: OTHER | Age: 45
Discharge: HOME | End: 2025-08-27
Attending: FAMILY MEDICINE
Payer: COMMERCIAL

## 2025-08-27 VITALS
TEMPERATURE: 98.5 F | HEART RATE: 70 BPM | OXYGEN SATURATION: 100 % | RESPIRATION RATE: 16 BRPM | SYSTOLIC BLOOD PRESSURE: 124 MMHG | DIASTOLIC BLOOD PRESSURE: 76 MMHG

## 2025-08-27 DIAGNOSIS — R92.8 ABNORMAL FINDING ON BREAST IMAGING: ICD-10-CM

## 2025-08-27 PROCEDURE — A4648 IMPLANTABLE TISSUE MARKER: HCPCS

## 2025-08-27 PROCEDURE — 999N000065 MA POST PROCEDURE RIGHT

## 2025-08-27 PROCEDURE — 250N000011 HC RX IP 250 OP 636: Performed by: RADIOLOGY

## 2025-08-27 PROCEDURE — 250N000009 HC RX 250: Performed by: RADIOLOGY

## 2025-08-27 RX ORDER — LIDOCAINE HYDROCHLORIDE AND EPINEPHRINE 10; 10 MG/ML; UG/ML
10-20 INJECTION, SOLUTION INFILTRATION; PERINEURAL
Status: COMPLETED | OUTPATIENT
Start: 2025-08-27 | End: 2025-08-27

## 2025-08-27 RX ADMIN — LIDOCAINE HYDROCHLORIDE 10 ML: 10 INJECTION, SOLUTION INFILTRATION; PERINEURAL at 13:24

## 2025-08-27 RX ADMIN — LIDOCAINE HYDROCHLORIDE AND EPINEPHRINE 20 ML: 10; 10 INJECTION, SOLUTION INFILTRATION; PERINEURAL at 13:24

## 2025-09-02 ENCOUNTER — OFFICE VISIT (OUTPATIENT)
Dept: SURGERY | Facility: OTHER | Age: 45
End: 2025-09-02
Attending: FAMILY MEDICINE
Payer: COMMERCIAL

## 2025-09-02 VITALS
OXYGEN SATURATION: 98 % | BODY MASS INDEX: 26.78 KG/M2 | RESPIRATION RATE: 16 BRPM | SYSTOLIC BLOOD PRESSURE: 120 MMHG | WEIGHT: 192 LBS | HEART RATE: 68 BPM | DIASTOLIC BLOOD PRESSURE: 82 MMHG | TEMPERATURE: 98.2 F

## 2025-09-02 DIAGNOSIS — R92.8 ABNORMAL MAMMOGRAM OF RIGHT BREAST: Primary | ICD-10-CM

## 2025-09-02 DIAGNOSIS — D24.1 FIBROADENOMA, RIGHT: ICD-10-CM

## 2025-09-02 ASSESSMENT — PAIN SCALES - GENERAL: PAINLEVEL_OUTOF10: MILD PAIN (3)

## (undated) DEVICE — ESU ELEC BLADE 2.75" COATED/INSULATED E1455

## (undated) DEVICE — SOL WATER 1500ML

## (undated) DEVICE — SU VICRYL 3-0 SH 27" UND J416H

## (undated) DEVICE — SUCTION MANIFOLD NEPTUNE 2 SYS 4 PORT 0702-020-000

## (undated) DEVICE — GLOVE PROTEXIS BLUE W/NEU-THERA 6.5  2D73EB65

## (undated) DEVICE — DRSG GAUZE 4X4" 3033

## (undated) DEVICE — LUBRICATING JELLY 2.7GM T00137

## (undated) DEVICE — LIGASURE

## (undated) DEVICE — ENDO BRUSH CHANNEL MASTER CLEANING 2-4.2MM BW-412T

## (undated) DEVICE — SLEEVE COMPRESSION SCD KNEE MED 74022

## (undated) DEVICE — TUBING SUCTION 10'X3/16" N510

## (undated) DEVICE — PENCIL MEGADYNE TELESCOPING SMOKE EVACUATION 10 FT 251010J

## (undated) DEVICE — Device

## (undated) DEVICE — ENDO KIT COMPLIANCE DYKENDOCMPLY

## (undated) DEVICE — SPONGE SURGIFOAM 12 1972

## (undated) DEVICE — GLOVE PROTEXIS POWDER FREE SMT 6.5  2D72PT65X

## (undated) DEVICE — PACK MAJOR LAPAROTOMY LF SBA15MLFCA

## (undated) RX ORDER — ATROPINE SULFATE 0.4 MG/ML
AMPUL (ML) INJECTION
Status: DISPENSED
Start: 2023-09-06

## (undated) RX ORDER — GLYCOPYRROLATE 0.2 MG/ML
INJECTION, SOLUTION INTRAMUSCULAR; INTRAVENOUS
Status: DISPENSED
Start: 2023-09-06

## (undated) RX ORDER — CHLOROPROCAINE HYDROCHLORIDE 20 MG/ML
INJECTION, SOLUTION EPIDURAL; INFILTRATION; INTRACAUDAL; PERINEURAL
Status: DISPENSED
Start: 2023-09-20

## (undated) RX ORDER — BUPIVACAINE HYDROCHLORIDE AND EPINEPHRINE 5; 5 MG/ML; UG/ML
INJECTION, SOLUTION EPIDURAL; INTRACAUDAL; PERINEURAL
Status: DISPENSED
Start: 2023-09-20

## (undated) RX ORDER — KETOROLAC TROMETHAMINE 30 MG/ML
INJECTION, SOLUTION INTRAMUSCULAR; INTRAVENOUS
Status: DISPENSED
Start: 2023-09-20

## (undated) RX ORDER — LIDOCAINE HYDROCHLORIDE 10 MG/ML
INJECTION, SOLUTION INFILTRATION; PERINEURAL
Status: DISPENSED
Start: 2025-08-27

## (undated) RX ORDER — LIDOCAINE HYDROCHLORIDE AND EPINEPHRINE 10; 10 MG/ML; UG/ML
INJECTION, SOLUTION INFILTRATION; PERINEURAL
Status: DISPENSED
Start: 2025-08-27

## (undated) RX ORDER — LIDOCAINE HCL/EPINEPHRINE/PF 2%-1:200K
VIAL (ML) INJECTION
Status: DISPENSED
Start: 2019-04-22

## (undated) RX ORDER — DEXAMETHASONE SODIUM PHOSPHATE 4 MG/ML
INJECTION, SOLUTION INTRA-ARTICULAR; INTRALESIONAL; INTRAMUSCULAR; INTRAVENOUS; SOFT TISSUE
Status: DISPENSED
Start: 2023-09-20

## (undated) RX ORDER — ONDANSETRON 2 MG/ML
INJECTION INTRAMUSCULAR; INTRAVENOUS
Status: DISPENSED
Start: 2023-09-20

## (undated) RX ORDER — DEXAMETHASONE SODIUM PHOSPHATE 4 MG/ML
INJECTION, SOLUTION INTRA-ARTICULAR; INTRALESIONAL; INTRAMUSCULAR; INTRAVENOUS; SOFT TISSUE
Status: DISPENSED
Start: 2025-03-03

## (undated) RX ORDER — ACETAMINOPHEN 325 MG/1
TABLET ORAL
Status: DISPENSED
Start: 2023-09-20

## (undated) RX ORDER — PROPOFOL 10 MG/ML
INJECTION, EMULSION INTRAVENOUS
Status: DISPENSED
Start: 2023-09-20

## (undated) RX ORDER — DIBUCAINE 0.28 G/28G
OINTMENT TOPICAL
Status: DISPENSED
Start: 2023-09-20

## (undated) RX ORDER — PROPOFOL 10 MG/ML
INJECTION, EMULSION INTRAVENOUS
Status: DISPENSED
Start: 2023-09-06